# Patient Record
Sex: FEMALE | Race: WHITE | Employment: OTHER | URBAN - METROPOLITAN AREA
[De-identification: names, ages, dates, MRNs, and addresses within clinical notes are randomized per-mention and may not be internally consistent; named-entity substitution may affect disease eponyms.]

---

## 2017-02-22 PROCEDURE — 85025 COMPLETE CBC W/AUTO DIFF WBC: CPT | Performed by: INTERNAL MEDICINE

## 2017-02-22 PROCEDURE — 80048 BASIC METABOLIC PNL TOTAL CA: CPT | Performed by: INTERNAL MEDICINE

## 2017-02-22 PROCEDURE — 36415 COLL VENOUS BLD VENIPUNCTURE: CPT | Performed by: INTERNAL MEDICINE

## 2017-02-28 RX ORDER — TROSPIUM CHLORIDE ER 60 MG/1
60 CAPSULE ORAL DAILY
COMMUNITY
End: 2017-10-13

## 2017-02-28 RX ORDER — SODIUM CHLORIDE 9 MG/ML
INJECTION, SOLUTION INTRAVENOUS ONCE
Status: COMPLETED | OUTPATIENT
Start: 2017-03-01 | End: 2017-03-01

## 2017-03-01 ENCOUNTER — HOSPITAL ENCOUNTER (OUTPATIENT)
Dept: INTERVENTIONAL RADIOLOGY/VASCULAR | Facility: HOSPITAL | Age: 82
Discharge: HOME | End: 2017-03-01
Attending: INTERNAL MEDICINE | Admitting: INTERNAL MEDICINE
Payer: MEDICARE

## 2017-03-01 VITALS
HEART RATE: 78 BPM | HEIGHT: 65.25 IN | RESPIRATION RATE: 18 BRPM | OXYGEN SATURATION: 99 % | DIASTOLIC BLOOD PRESSURE: 62 MMHG | SYSTOLIC BLOOD PRESSURE: 117 MMHG | WEIGHT: 130 LBS | BODY MASS INDEX: 21.4 KG/M2

## 2017-03-01 DIAGNOSIS — Z45.018 PACEMAKER END OF LIFE: ICD-10-CM

## 2017-03-01 DIAGNOSIS — I44.1 SECOND DEGREE HEART BLOCK: ICD-10-CM

## 2017-03-01 LAB — MRSA DNA SPEC QL NAA+PROBE: NEGATIVE

## 2017-03-01 PROCEDURE — 33228 REMV&REPLC PM GEN DUAL LEAD: CPT

## 2017-03-01 PROCEDURE — 87641 MR-STAPH DNA AMP PROBE: CPT | Performed by: INTERNAL MEDICINE

## 2017-03-01 RX ORDER — SODIUM CHLORIDE 9 MG/ML
INJECTION, SOLUTION INTRAVENOUS
Status: COMPLETED
Start: 2017-03-01 | End: 2017-03-01

## 2017-03-01 RX ORDER — MIDAZOLAM HYDROCHLORIDE 1 MG/ML
INJECTION INTRAMUSCULAR; INTRAVENOUS
Status: COMPLETED
Start: 2017-03-01 | End: 2017-03-01

## 2017-03-01 RX ORDER — BACITRACIN 50000 [USP'U]/1
INJECTION, POWDER, LYOPHILIZED, FOR SOLUTION INTRAMUSCULAR
Status: COMPLETED
Start: 2017-03-01 | End: 2017-03-01

## 2017-03-01 RX ORDER — DIPHENHYDRAMINE HYDROCHLORIDE 50 MG/ML
INJECTION INTRAMUSCULAR; INTRAVENOUS
Status: DISCONTINUED
Start: 2017-03-01 | End: 2017-03-01 | Stop reason: WASHOUT

## 2017-03-01 RX ORDER — LIDOCAINE HYDROCHLORIDE AND EPINEPHRINE 10; 10 MG/ML; UG/ML
INJECTION, SOLUTION INFILTRATION; PERINEURAL
Status: COMPLETED
Start: 2017-03-01 | End: 2017-03-01

## 2017-03-01 RX ADMIN — SODIUM CHLORIDE: 9 INJECTION, SOLUTION INTRAVENOUS at 09:30:00

## 2017-03-01 NOTE — PROCEDURES
[facility]    PATIENT'S NAME: Roberts Carrel A   ATTENDING PHYSICIAN: Roly Briscoe MD   OPERATING PHYSICIAN: Yary Bailey MD   PATIENT ACCOUNT#:   [id number]    LOCATION:  [place] [address]  MEDICAL RECORD #:   [id number] R waves of 12.5, pacing impedance 937, pacing threshold 0.75 V at 0.4 ms. The pocket was closed with interrupted 2-0 Vicryl, reinforced with running 3-0 Vicryl. The skin was closed with subcuticular 4-0 Dexon and reinforced with Steri-Strips.   The grady

## 2017-03-01 NOTE — PLAN OF CARE
Pt ambulated in room and to tyfaucyf-rkucvr-ovwfojpyo activity well Liquids and food taken post procedure and tolerated well

## 2017-11-30 ENCOUNTER — HOSPITAL ENCOUNTER (OUTPATIENT)
Dept: MAMMOGRAPHY | Age: 82
Discharge: HOME | End: 2017-11-30
Attending: INTERNAL MEDICINE
Payer: MEDICARE

## 2017-11-30 DIAGNOSIS — Z12.31 VISIT FOR SCREENING MAMMOGRAM: ICD-10-CM

## 2017-11-30 DIAGNOSIS — Z12.31 ENCOUNTER FOR SCREENING MAMMOGRAM FOR MALIGNANT NEOPLASM OF BREAST: ICD-10-CM

## 2017-12-18 ENCOUNTER — HOSPITAL ENCOUNTER (OUTPATIENT)
Age: 82
Discharge: HOME | End: 2017-12-18
Attending: FAMILY MEDICINE
Payer: MEDICARE

## 2017-12-18 VITALS
SYSTOLIC BLOOD PRESSURE: 128 MMHG | DIASTOLIC BLOOD PRESSURE: 71 MMHG | HEIGHT: 65.5 IN | TEMPERATURE: 98 F | WEIGHT: 126 LBS | RESPIRATION RATE: 18 BRPM | HEART RATE: 94 BPM | OXYGEN SATURATION: 100 % | BODY MASS INDEX: 20.74 KG/M2

## 2017-12-18 DIAGNOSIS — B02.9 HERPES ZOSTER WITHOUT COMPLICATION: Primary | ICD-10-CM

## 2017-12-18 PROCEDURE — 99214 OFFICE O/P EST MOD 30 MIN: CPT

## 2017-12-18 PROCEDURE — 99213 OFFICE O/P EST LOW 20 MIN: CPT

## 2017-12-18 RX ORDER — VALACYCLOVIR HYDROCHLORIDE 1 G/1
1 TABLET, FILM COATED ORAL 3 TIMES DAILY
Qty: 21 TABLET | Refills: 0 | Status: SHIPPED | OUTPATIENT
Start: 2017-12-18 | End: 2017-12-25

## 2017-12-18 NOTE — ED PROVIDER NOTES
Patient Seen in: [facility] In [address] [place] [address]    History   Patient presents with:  Rash Skin Problem (integumentary)    Stated Complaint: rash    HPI    Rash over R lower chest and lateral chest  X 4 days  Mild burning and pain   No other erythematous base along T10 dermatome.      ED Course   Labs Reviewed - No data to display    ED Course as of Dec 18 1410  ------------------------------------------------------------       [facility]       Disposition and Plan     Clinical Impression:  Herpes zos

## 2018-03-20 ENCOUNTER — HOSPITAL ENCOUNTER (OUTPATIENT)
Dept: MAMMOGRAPHY | Age: 83
Discharge: HOME | End: 2018-03-20
Attending: INTERNAL MEDICINE
Payer: MEDICARE

## 2018-03-20 PROCEDURE — 77067 SCR MAMMO BI INCL CAD: CPT | Performed by: INTERNAL MEDICINE

## 2018-08-19 ENCOUNTER — HOSPITAL ENCOUNTER (EMERGENCY)
Facility: HOSPITAL | Age: 83
Discharge: HOME | End: 2018-08-19
Attending: EMERGENCY MEDICINE
Payer: MEDICARE

## 2018-08-19 VITALS
HEART RATE: 81 BPM | WEIGHT: 128 LBS | TEMPERATURE: 98 F | OXYGEN SATURATION: 100 % | SYSTOLIC BLOOD PRESSURE: 145 MMHG | HEIGHT: 65 IN | BODY MASS INDEX: 21.33 KG/M2 | RESPIRATION RATE: 18 BRPM | DIASTOLIC BLOOD PRESSURE: 70 MMHG

## 2018-08-19 DIAGNOSIS — N39.0 URINARY TRACT INFECTION WITHOUT HEMATURIA, SITE UNSPECIFIED: Primary | ICD-10-CM

## 2018-08-19 LAB
BILIRUB UR QL: NEGATIVE
COLOR UR: YELLOW
GLUCOSE UR-MCNC: NEGATIVE MG/DL
KETONES UR-MCNC: NEGATIVE MG/DL
NITRITE UR QL STRIP.AUTO: NEGATIVE
PH UR: 7 [PH] (ref 5–8)
PROT UR-MCNC: NEGATIVE MG/DL
RBC #/AREA URNS AUTO: 4 /HPF
SP GR UR STRIP: 1.01 (ref 1–1.03)
UROBILINOGEN UR STRIP-ACNC: <2
VIT C UR-MCNC: NEGATIVE MG/DL
WBC #/AREA URNS AUTO: 312 /HPF

## 2018-08-19 PROCEDURE — 87088 URINE BACTERIA CULTURE: CPT | Performed by: EMERGENCY MEDICINE

## 2018-08-19 PROCEDURE — 87186 SC STD MICRODIL/AGAR DIL: CPT | Performed by: EMERGENCY MEDICINE

## 2018-08-19 PROCEDURE — 87086 URINE CULTURE/COLONY COUNT: CPT | Performed by: EMERGENCY MEDICINE

## 2018-08-19 PROCEDURE — 99283 EMERGENCY DEPT VISIT LOW MDM: CPT

## 2018-08-19 PROCEDURE — 81001 URINALYSIS AUTO W/SCOPE: CPT | Performed by: EMERGENCY MEDICINE

## 2018-08-19 RX ORDER — SULFAMETHOXAZOLE AND TRIMETHOPRIM 800; 160 MG/1; MG/1
1 TABLET ORAL 2 TIMES DAILY
Qty: 14 TABLET | Refills: 0 | Status: SHIPPED | OUTPATIENT
Start: 2018-08-19 | End: 2018-08-26

## 2018-08-19 NOTE — ED PROVIDER NOTES
Patient Seen in: [facility] AND [facility] Emergency Department    History   Patient presents with:  Urinary Symptoms (urologic)    Stated Complaint:     HPI    59-year-old female with history of overactive bladder, prior urinary tract infections, Parkinson's di SpO2 100%   BMI 21.30 kg/m²         Physical Exam    General Appearance: alert, no distress  Eyes: pupils equal and round no pallor or injection  ENT, Mouth: mucous membranes moist  Respiratory: there are no retractions, lungs are clear to auscultation  C tablet  Take 1 tablet by mouth 2 (two) times daily.   Qty: 14 tablet Refills: 0

## 2018-09-10 ENCOUNTER — HOSPITAL ENCOUNTER (OUTPATIENT)
Dept: CT IMAGING | Facility: HOSPITAL | Age: 83
Discharge: HOME | End: 2018-09-10
Attending: ORTHOPAEDIC SURGERY
Payer: MEDICARE

## 2018-09-10 DIAGNOSIS — M17.11 OSTEOARTHRITIS OF RIGHT KNEE: ICD-10-CM

## 2018-09-10 PROCEDURE — 73700 CT LOWER EXTREMITY W/O DYE: CPT | Performed by: ORTHOPAEDIC SURGERY

## 2018-10-30 ENCOUNTER — LAB ENCOUNTER (OUTPATIENT)
Dept: LAB | Age: 83
End: 2018-10-30
Attending: ORTHOPAEDIC SURGERY
Payer: MEDICARE

## 2018-10-30 DIAGNOSIS — Z01.818 PREOP TESTING: ICD-10-CM

## 2018-10-30 PROCEDURE — 86850 RBC ANTIBODY SCREEN: CPT

## 2018-10-30 PROCEDURE — 36415 COLL VENOUS BLD VENIPUNCTURE: CPT

## 2018-10-30 PROCEDURE — 86900 BLOOD TYPING SEROLOGIC ABO: CPT

## 2018-10-30 PROCEDURE — 86901 BLOOD TYPING SEROLOGIC RH(D): CPT

## 2018-11-05 ENCOUNTER — APPOINTMENT (OUTPATIENT)
Dept: LAB | Age: 83
DRG: 470 | End: 2018-11-05
Attending: ORTHOPAEDIC SURGERY
Payer: MEDICARE

## 2018-11-05 DIAGNOSIS — Z01.818 PREOP TESTING: ICD-10-CM

## 2018-11-05 PROCEDURE — 87641 MR-STAPH DNA AMP PROBE: CPT

## 2018-11-05 NOTE — H&P
[facility]    History & Physical    Gabriela De La Rosa Patient Status:  Surgery Admit    1935 MRN [id number]   Location [place] [address] Attending Roosevelt Das, *   Hosp Day # 0 PCP Jonas Johnson MD, MD History   Problem Relation Age of Onset   • Breast Cancer Paternal Aunt 61   • Heart Disease Father         CAD   • Hypertension Father    • Cancer Mother 50        stomach cancer    • Cancer Other         NO family h/o bladder or kidney cancer   • Kidney skyline views of the right knee demonstrates fairly advanced osteoarthritis involving the lateral compartment with subchondral sclerosis. There are possibly some loose bodies posteriorly. There is significant chondrocalcinosis in both knees.         Assessm

## 2018-11-06 ENCOUNTER — APPOINTMENT (OUTPATIENT)
Dept: GENERAL RADIOLOGY | Facility: HOSPITAL | Age: 83
DRG: 470 | End: 2018-11-06
Attending: PHYSICIAN ASSISTANT
Payer: MEDICARE

## 2018-11-06 ENCOUNTER — ANESTHESIA EVENT (OUTPATIENT)
Dept: SURGERY | Facility: HOSPITAL | Age: 83
DRG: 470 | End: 2018-11-06
Payer: MEDICARE

## 2018-11-06 ENCOUNTER — HOSPITAL ENCOUNTER (INPATIENT)
Facility: HOSPITAL | Age: 83
LOS: 3 days | Discharge: NURSING, REHAB OR LONG-TERM CARE | DRG: 470 | End: 2018-11-09
Attending: ORTHOPAEDIC SURGERY | Admitting: ORTHOPAEDIC SURGERY
Payer: MEDICARE

## 2018-11-06 ENCOUNTER — ANESTHESIA (OUTPATIENT)
Dept: SURGERY | Facility: HOSPITAL | Age: 83
DRG: 470 | End: 2018-11-06
Payer: MEDICARE

## 2018-11-06 DIAGNOSIS — Z01.818 PREOP TESTING: Primary | ICD-10-CM

## 2018-11-06 PROBLEM — G20 PARKINSON DISEASE (HCC): Chronic | Status: ACTIVE | Noted: 2018-11-06

## 2018-11-06 PROBLEM — M17.11 OSTEOARTHRITIS OF RIGHT KNEE: Status: ACTIVE | Noted: 2018-11-06

## 2018-11-06 PROBLEM — G20.A1 PARKINSON DISEASE: Chronic | Status: ACTIVE | Noted: 2018-11-06

## 2018-11-06 PROBLEM — G20.A1 PARKINSON DISEASE (HCC): Chronic | Status: ACTIVE | Noted: 2018-11-06

## 2018-11-06 PROCEDURE — 0SRC0J9 REPLACEMENT OF RIGHT KNEE JOINT WITH SYNTHETIC SUBSTITUTE, CEMENTED, OPEN APPROACH: ICD-10-PCS | Performed by: ORTHOPAEDIC SURGERY

## 2018-11-06 PROCEDURE — 73560 X-RAY EXAM OF KNEE 1 OR 2: CPT | Performed by: PHYSICIAN ASSISTANT

## 2018-11-06 PROCEDURE — 99232 SBSQ HOSP IP/OBS MODERATE 35: CPT | Performed by: HOSPITALIST

## 2018-11-06 DEVICE — FEMUR SPHERE CEMENTED RIGHT, SIZE 3+
Type: IMPLANTABLE DEVICE | Site: KNEE | Status: FUNCTIONAL
Brand: GMK SPHERE TOTAL KNEE SYSTEM

## 2018-11-06 DEVICE — BONE CEMENT HI VISCOSITY R+G: Type: IMPLANTABLE DEVICE | Site: KNEE | Status: FUNCTIONAL

## 2018-11-06 DEVICE — FIXED TIBIAL TRAY CEMENTED RIGHT, SIZE 3
Type: IMPLANTABLE DEVICE | Site: KNEE | Status: FUNCTIONAL
Brand: GMK PRIMARY TOTAL KNEE SYSTEM

## 2018-11-06 RX ORDER — LIDOCAINE HYDROCHLORIDE 10 MG/ML
INJECTION, SOLUTION INFILTRATION; PERINEURAL
Status: COMPLETED | OUTPATIENT
Start: 2018-11-06 | End: 2018-11-06

## 2018-11-06 RX ORDER — MORPHINE SULFATE 2 MG/ML
2 INJECTION, SOLUTION INTRAMUSCULAR; INTRAVENOUS EVERY 2 HOUR PRN
Status: DISCONTINUED | OUTPATIENT
Start: 2018-11-06 | End: 2018-11-09

## 2018-11-06 RX ORDER — FAMOTIDINE 20 MG/1
20 TABLET ORAL ONCE
Status: DISCONTINUED | OUTPATIENT
Start: 2018-11-06 | End: 2018-11-06 | Stop reason: HOSPADM

## 2018-11-06 RX ORDER — SENNOSIDES 8.6 MG
17.2 TABLET ORAL NIGHTLY
Status: DISCONTINUED | OUTPATIENT
Start: 2018-11-06 | End: 2018-11-09

## 2018-11-06 RX ORDER — ASPIRIN 325 MG
325 TABLET ORAL 2 TIMES DAILY
Status: DISCONTINUED | OUTPATIENT
Start: 2018-11-06 | End: 2018-11-09

## 2018-11-06 RX ORDER — SODIUM CHLORIDE, SODIUM LACTATE, POTASSIUM CHLORIDE, CALCIUM CHLORIDE 600; 310; 30; 20 MG/100ML; MG/100ML; MG/100ML; MG/100ML
INJECTION, SOLUTION INTRAVENOUS CONTINUOUS
Status: DISCONTINUED | OUTPATIENT
Start: 2018-11-06 | End: 2018-11-06 | Stop reason: HOSPADM

## 2018-11-06 RX ORDER — SENNOSIDES 8.6 MG
8.6 TABLET ORAL DAILY
Status: DISCONTINUED | OUTPATIENT
Start: 2018-11-06 | End: 2018-11-09

## 2018-11-06 RX ORDER — MORPHINE SULFATE 4 MG/ML
2 INJECTION, SOLUTION INTRAMUSCULAR; INTRAVENOUS EVERY 10 MIN PRN
Status: DISCONTINUED | OUTPATIENT
Start: 2018-11-06 | End: 2018-11-06 | Stop reason: HOSPADM

## 2018-11-06 RX ORDER — ACETAMINOPHEN 325 MG/1
650 TABLET ORAL EVERY 6 HOURS PRN
Status: ACTIVE | OUTPATIENT
Start: 2018-11-06 | End: 2018-11-07

## 2018-11-06 RX ORDER — ACETAMINOPHEN 325 MG/1
325 TABLET ORAL EVERY 6 HOURS PRN
Status: DISCONTINUED | OUTPATIENT
Start: 2018-11-06 | End: 2018-11-09

## 2018-11-06 RX ORDER — LIDOCAINE HYDROCHLORIDE 10 MG/ML
INJECTION, SOLUTION EPIDURAL; INFILTRATION; INTRACAUDAL; PERINEURAL AS NEEDED
Status: DISCONTINUED | OUTPATIENT
Start: 2018-11-06 | End: 2018-11-06 | Stop reason: SURG

## 2018-11-06 RX ORDER — ACETAMINOPHEN 500 MG
1000 TABLET ORAL ONCE
Status: DISCONTINUED | OUTPATIENT
Start: 2018-11-06 | End: 2018-11-06 | Stop reason: HOSPADM

## 2018-11-06 RX ORDER — BUPIVACAINE HYDROCHLORIDE AND EPINEPHRINE 5; 5 MG/ML; UG/ML
INJECTION, SOLUTION PERINEURAL AS NEEDED
Status: DISCONTINUED | OUTPATIENT
Start: 2018-11-06 | End: 2018-11-06 | Stop reason: HOSPADM

## 2018-11-06 RX ORDER — DIPHENHYDRAMINE HCL 25 MG
25 CAPSULE ORAL EVERY 4 HOURS PRN
Status: ACTIVE | OUTPATIENT
Start: 2018-11-06 | End: 2018-11-07

## 2018-11-06 RX ORDER — HYDROCODONE BITARTRATE AND ACETAMINOPHEN 5; 325 MG/1; MG/1
2 TABLET ORAL EVERY 4 HOURS PRN
Status: DISCONTINUED | OUTPATIENT
Start: 2018-11-06 | End: 2018-11-09

## 2018-11-06 RX ORDER — MORPHINE SULFATE 2 MG/ML
1 INJECTION, SOLUTION INTRAMUSCULAR; INTRAVENOUS EVERY 2 HOUR PRN
Status: DISCONTINUED | OUTPATIENT
Start: 2018-11-06 | End: 2018-11-09

## 2018-11-06 RX ORDER — POLYETHYLENE GLYCOL 3350 17 G/17G
17 POWDER, FOR SOLUTION ORAL DAILY PRN
Status: DISCONTINUED | OUTPATIENT
Start: 2018-11-06 | End: 2018-11-09

## 2018-11-06 RX ORDER — MORPHINE SULFATE 1 MG/ML
INJECTION, SOLUTION EPIDURAL; INTRATHECAL; INTRAVENOUS
Status: COMPLETED | OUTPATIENT
Start: 2018-11-06 | End: 2018-11-06

## 2018-11-06 RX ORDER — DIPHENHYDRAMINE HYDROCHLORIDE 50 MG/ML
25 INJECTION INTRAMUSCULAR; INTRAVENOUS ONCE AS NEEDED
Status: ACTIVE | OUTPATIENT
Start: 2018-11-06 | End: 2018-11-06

## 2018-11-06 RX ORDER — HYDROCODONE BITARTRATE AND ACETAMINOPHEN 5; 325 MG/1; MG/1
1 TABLET ORAL EVERY 4 HOURS PRN
Status: DISCONTINUED | OUTPATIENT
Start: 2018-11-06 | End: 2018-11-09

## 2018-11-06 RX ORDER — BUPIVACAINE HYDROCHLORIDE 7.5 MG/ML
INJECTION, SOLUTION INTRASPINAL
Status: COMPLETED | OUTPATIENT
Start: 2018-11-06 | End: 2018-11-06

## 2018-11-06 RX ORDER — DEXTROSE, SODIUM CHLORIDE, AND POTASSIUM CHLORIDE 5; .45; .15 G/100ML; G/100ML; G/100ML
INJECTION INTRAVENOUS CONTINUOUS
Status: DISCONTINUED | OUTPATIENT
Start: 2018-11-06 | End: 2018-11-08

## 2018-11-06 RX ORDER — DOCUSATE SODIUM 100 MG/1
100 CAPSULE, LIQUID FILLED ORAL 2 TIMES DAILY
Status: DISCONTINUED | OUTPATIENT
Start: 2018-11-06 | End: 2018-11-09

## 2018-11-06 RX ORDER — HYDROCODONE BITARTRATE AND ACETAMINOPHEN 7.5; 325 MG/1; MG/1
2 TABLET ORAL EVERY 6 HOURS PRN
Status: ACTIVE | OUTPATIENT
Start: 2018-11-06 | End: 2018-11-07

## 2018-11-06 RX ORDER — SODIUM CHLORIDE 0.9 % (FLUSH) 0.9 %
10 SYRINGE (ML) INJECTION AS NEEDED
Status: DISCONTINUED | OUTPATIENT
Start: 2018-11-06 | End: 2018-11-09

## 2018-11-06 RX ORDER — HYDROCODONE BITARTRATE AND ACETAMINOPHEN 5; 325 MG/1; MG/1
2 TABLET ORAL AS NEEDED
Status: DISCONTINUED | OUTPATIENT
Start: 2018-11-06 | End: 2018-11-06 | Stop reason: HOSPADM

## 2018-11-06 RX ORDER — PANTOPRAZOLE SODIUM 40 MG/1
40 TABLET, DELAYED RELEASE ORAL
Status: DISCONTINUED | OUTPATIENT
Start: 2018-11-07 | End: 2018-11-09

## 2018-11-06 RX ORDER — CELECOXIB 200 MG/1
200 CAPSULE ORAL EVERY 12 HOURS SCHEDULED
Status: DISCONTINUED | OUTPATIENT
Start: 2018-11-06 | End: 2018-11-09

## 2018-11-06 RX ORDER — CARBIDOPA AND LEVODOPA 25; 100 MG/1; MG/1
0.5 TABLET, ORALLY DISINTEGRATING ORAL AS NEEDED
COMMUNITY

## 2018-11-06 RX ORDER — MELATONIN
325
Status: DISCONTINUED | OUTPATIENT
Start: 2018-11-07 | End: 2018-11-09

## 2018-11-06 RX ORDER — HALOPERIDOL 5 MG/ML
0.25 INJECTION INTRAMUSCULAR ONCE AS NEEDED
Status: DISCONTINUED | OUTPATIENT
Start: 2018-11-06 | End: 2018-11-06 | Stop reason: HOSPADM

## 2018-11-06 RX ORDER — NALBUPHINE HCL 10 MG/ML
2.5 AMPUL (ML) INJECTION EVERY 4 HOURS PRN
Status: ACTIVE | OUTPATIENT
Start: 2018-11-06 | End: 2018-11-07

## 2018-11-06 RX ORDER — ACETAMINOPHEN 500 MG
1000 TABLET ORAL ONCE
Status: COMPLETED | OUTPATIENT
Start: 2018-11-06 | End: 2018-11-06

## 2018-11-06 RX ORDER — FAMOTIDINE 10 MG/ML
20 INJECTION, SOLUTION INTRAVENOUS 2 TIMES DAILY
Status: DISCONTINUED | OUTPATIENT
Start: 2018-11-06 | End: 2018-11-07

## 2018-11-06 RX ORDER — BISACODYL 10 MG
10 SUPPOSITORY, RECTAL RECTAL
Status: DISCONTINUED | OUTPATIENT
Start: 2018-11-06 | End: 2018-11-09

## 2018-11-06 RX ORDER — SODIUM PHOSPHATE, DIBASIC AND SODIUM PHOSPHATE, MONOBASIC 7; 19 G/133ML; G/133ML
1 ENEMA RECTAL ONCE AS NEEDED
Status: DISCONTINUED | OUTPATIENT
Start: 2018-11-06 | End: 2018-11-09

## 2018-11-06 RX ORDER — HYDROCODONE BITARTRATE AND ACETAMINOPHEN 5; 325 MG/1; MG/1
1 TABLET ORAL AS NEEDED
Status: DISCONTINUED | OUTPATIENT
Start: 2018-11-06 | End: 2018-11-06 | Stop reason: HOSPADM

## 2018-11-06 RX ORDER — ONDANSETRON 2 MG/ML
4 INJECTION INTRAMUSCULAR; INTRAVENOUS ONCE AS NEEDED
Status: ACTIVE | OUTPATIENT
Start: 2018-11-06 | End: 2018-11-06

## 2018-11-06 RX ORDER — DIPHENHYDRAMINE HYDROCHLORIDE 50 MG/ML
12.5 INJECTION INTRAMUSCULAR; INTRAVENOUS EVERY 4 HOURS PRN
Status: ACTIVE | OUTPATIENT
Start: 2018-11-06 | End: 2018-11-07

## 2018-11-06 RX ORDER — MORPHINE SULFATE 4 MG/ML
4 INJECTION, SOLUTION INTRAMUSCULAR; INTRAVENOUS EVERY 2 HOUR PRN
Status: DISCONTINUED | OUTPATIENT
Start: 2018-11-06 | End: 2018-11-09

## 2018-11-06 RX ORDER — NALOXONE HYDROCHLORIDE 0.4 MG/ML
80 INJECTION, SOLUTION INTRAMUSCULAR; INTRAVENOUS; SUBCUTANEOUS AS NEEDED
Status: DISCONTINUED | OUTPATIENT
Start: 2018-11-06 | End: 2018-11-06 | Stop reason: HOSPADM

## 2018-11-06 RX ORDER — MORPHINE SULFATE 4 MG/ML
4 INJECTION, SOLUTION INTRAMUSCULAR; INTRAVENOUS EVERY 10 MIN PRN
Status: DISCONTINUED | OUTPATIENT
Start: 2018-11-06 | End: 2018-11-06 | Stop reason: HOSPADM

## 2018-11-06 RX ORDER — METOCLOPRAMIDE HYDROCHLORIDE 5 MG/ML
10 INJECTION INTRAMUSCULAR; INTRAVENOUS EVERY 6 HOURS PRN
Status: ACTIVE | OUTPATIENT
Start: 2018-11-06 | End: 2018-11-08

## 2018-11-06 RX ORDER — GABAPENTIN 600 MG/1
600 TABLET ORAL ONCE
Status: COMPLETED | OUTPATIENT
Start: 2018-11-06 | End: 2018-11-06

## 2018-11-06 RX ORDER — ONDANSETRON 2 MG/ML
4 INJECTION INTRAMUSCULAR; INTRAVENOUS ONCE AS NEEDED
Status: COMPLETED | OUTPATIENT
Start: 2018-11-06 | End: 2018-11-06

## 2018-11-06 RX ORDER — MIDAZOLAM HYDROCHLORIDE 1 MG/ML
INJECTION INTRAMUSCULAR; INTRAVENOUS AS NEEDED
Status: DISCONTINUED | OUTPATIENT
Start: 2018-11-06 | End: 2018-11-06 | Stop reason: SURG

## 2018-11-06 RX ORDER — HYDROCODONE BITARTRATE AND ACETAMINOPHEN 7.5; 325 MG/1; MG/1
1 TABLET ORAL EVERY 6 HOURS PRN
Status: ACTIVE | OUTPATIENT
Start: 2018-11-06 | End: 2018-11-07

## 2018-11-06 RX ORDER — MORPHINE SULFATE 10 MG/ML
6 INJECTION, SOLUTION INTRAMUSCULAR; INTRAVENOUS EVERY 10 MIN PRN
Status: DISCONTINUED | OUTPATIENT
Start: 2018-11-06 | End: 2018-11-06 | Stop reason: HOSPADM

## 2018-11-06 RX ORDER — NALOXONE HYDROCHLORIDE 0.4 MG/ML
0.08 INJECTION, SOLUTION INTRAMUSCULAR; INTRAVENOUS; SUBCUTANEOUS
Status: ACTIVE | OUTPATIENT
Start: 2018-11-06 | End: 2018-11-07

## 2018-11-06 RX ORDER — EPHEDRINE SULFATE 50 MG/ML
INJECTION, SOLUTION INTRAVENOUS AS NEEDED
Status: DISCONTINUED | OUTPATIENT
Start: 2018-11-06 | End: 2018-11-06 | Stop reason: SURG

## 2018-11-06 RX ORDER — METOCLOPRAMIDE 10 MG/1
10 TABLET ORAL ONCE
Status: DISCONTINUED | OUTPATIENT
Start: 2018-11-06 | End: 2018-11-06 | Stop reason: HOSPADM

## 2018-11-06 RX ORDER — ACETAMINOPHEN 325 MG/1
650 TABLET ORAL EVERY 4 HOURS PRN
Status: DISCONTINUED | OUTPATIENT
Start: 2018-11-06 | End: 2018-11-09

## 2018-11-06 RX ORDER — DIPHENHYDRAMINE HCL 25 MG
25 CAPSULE ORAL EVERY 4 HOURS PRN
Status: DISCONTINUED | OUTPATIENT
Start: 2018-11-06 | End: 2018-11-09

## 2018-11-06 RX ORDER — ROSUVASTATIN CALCIUM 10 MG/1
10 TABLET, COATED ORAL DAILY
Status: DISCONTINUED | OUTPATIENT
Start: 2018-11-06 | End: 2018-11-09

## 2018-11-06 RX ORDER — FAMOTIDINE 20 MG/1
20 TABLET ORAL 2 TIMES DAILY
Status: DISCONTINUED | OUTPATIENT
Start: 2018-11-06 | End: 2018-11-07

## 2018-11-06 RX ORDER — ONDANSETRON 2 MG/ML
4 INJECTION INTRAMUSCULAR; INTRAVENOUS EVERY 4 HOURS PRN
Status: DISPENSED | OUTPATIENT
Start: 2018-11-06 | End: 2018-11-08

## 2018-11-06 RX ORDER — CEFAZOLIN SODIUM/WATER 2 G/20 ML
2 SYRINGE (ML) INTRAVENOUS ONCE
Status: COMPLETED | OUTPATIENT
Start: 2018-11-06 | End: 2018-11-06

## 2018-11-06 RX ORDER — SODIUM CHLORIDE, SODIUM LACTATE, POTASSIUM CHLORIDE, CALCIUM CHLORIDE 600; 310; 30; 20 MG/100ML; MG/100ML; MG/100ML; MG/100ML
INJECTION, SOLUTION INTRAVENOUS CONTINUOUS
Status: DISCONTINUED | OUTPATIENT
Start: 2018-11-06 | End: 2018-11-08

## 2018-11-06 RX ORDER — CELECOXIB 200 MG/1
400 CAPSULE ORAL ONCE
Status: COMPLETED | OUTPATIENT
Start: 2018-11-06 | End: 2018-11-06

## 2018-11-06 RX ORDER — DIPHENHYDRAMINE HYDROCHLORIDE 50 MG/ML
12.5 INJECTION INTRAMUSCULAR; INTRAVENOUS EVERY 4 HOURS PRN
Status: DISCONTINUED | OUTPATIENT
Start: 2018-11-06 | End: 2018-11-09

## 2018-11-06 RX ORDER — GABAPENTIN 300 MG/1
300 CAPSULE ORAL NIGHTLY
Status: DISCONTINUED | OUTPATIENT
Start: 2018-11-06 | End: 2018-11-09

## 2018-11-06 RX ADMIN — MIDAZOLAM HYDROCHLORIDE 0.5 MG: 1 INJECTION INTRAMUSCULAR; INTRAVENOUS at 09:52:00

## 2018-11-06 RX ADMIN — MIDAZOLAM HYDROCHLORIDE 0.5 MG: 1 INJECTION INTRAMUSCULAR; INTRAVENOUS at 09:47:00

## 2018-11-06 RX ADMIN — LIDOCAINE HYDROCHLORIDE 2 ML: 10 INJECTION, SOLUTION INFILTRATION; PERINEURAL at 10:12:00

## 2018-11-06 RX ADMIN — SODIUM CHLORIDE, SODIUM LACTATE, POTASSIUM CHLORIDE, CALCIUM CHLORIDE: 600; 310; 30; 20 INJECTION, SOLUTION INTRAVENOUS at 11:40:00

## 2018-11-06 RX ADMIN — BUPIVACAINE HYDROCHLORIDE 1.2 ML: 7.5 INJECTION, SOLUTION INTRASPINAL at 10:12:00

## 2018-11-06 RX ADMIN — SODIUM CHLORIDE, SODIUM LACTATE, POTASSIUM CHLORIDE, CALCIUM CHLORIDE: 600; 310; 30; 20 INJECTION, SOLUTION INTRAVENOUS at 09:44:00

## 2018-11-06 RX ADMIN — EPHEDRINE SULFATE 10 MG: 50 INJECTION, SOLUTION INTRAVENOUS at 10:04:00

## 2018-11-06 RX ADMIN — MORPHINE SULFATE 0.2 MG: 1 INJECTION, SOLUTION EPIDURAL; INTRATHECAL; INTRAVENOUS at 10:12:00

## 2018-11-06 RX ADMIN — CEFAZOLIN SODIUM/WATER 2 G: 2 G/20 ML SYRINGE (ML) INTRAVENOUS at 09:55:00

## 2018-11-06 RX ADMIN — EPHEDRINE SULFATE 10 MG: 50 INJECTION, SOLUTION INTRAVENOUS at 11:21:00

## 2018-11-06 RX ADMIN — LIDOCAINE HYDROCHLORIDE 40 MG: 10 INJECTION, SOLUTION EPIDURAL; INFILTRATION; INTRACAUDAL; PERINEURAL at 09:52:00

## 2018-11-06 NOTE — PHYSICAL THERAPY NOTE
PHYSICAL THERAPY KNEE EVALUATION - INPATIENT       Room Number: 442/442-A  Evaluation Date: 11/6/2018  Type of Evaluation: Initial  Physician Order: PT Eval and Treat    Presenting Problem: R TKA  Reason for Therapy: Mobility Dysfunction and Discharge Plan preparation for discharge. DISCHARGE RECOMMENDATIONS  PT Discharge Recommendations: Sub-acute rehabilitation    PLAN  PT Treatment Plan: Bed mobility; Patient education;Gait training;Family education;Range of motion;Strengthening;Stoop training;Stair tra Bearing as Tolerated       PAIN ASSESSMENT  Ratin          COGNITION  · Overall Cognitive Status:  WFL - within functional limits  · Arousal/Alertness:  lethargic    RANGE OF MOTION AND STRENGTH ASSESSMENT  Upper extremity ROM and strength are within f Not tested       Bed Mobility: Min A x 1    Transfers: Min A x 1 with use of RW upon standing.      Exercise/Education Provided:  Bed mobility  Energy conservation  Functional activity tolerated  Gait training  Posture  ROM  Strengthening  Lower therapeutic

## 2018-11-06 NOTE — ANESTHESIA PROCEDURE NOTES
Spinal Block  Performed by: Pasquale Sanchez CRNA  Authorized by: Stephan Antoine MD     Patient Location:  OR  Start Time:  11/6/2018 9:49 AM  End Time:  11/6/2018 9:51 AM  Anesthesiologist:  Stephan Antoine MD  CRNA:  Pasquale Sanchez CRNA  Perform

## 2018-11-06 NOTE — BRIEF OP NOTE
Pre-Operative Diagnosis: Osteoarthritis right knee     Post-Operative Diagnosis: Osteoarthritis right knee      Procedure Performed:   Procedure(s):  Right total knee arthroplasty    Surgeon(s) and Role:     * Justice Whyte MD - Primary    Assist

## 2018-11-06 NOTE — PROGRESS NOTES
5 [facility] Patient Status:  Inpatient    1935 MRN [id number]   Location [facility] 4W/SW/SE Attending Adolph Ding MD   Hosp Day # 0 PCP Melody Bermudez MD, MD     Subjective:  Post-Operative Day: 0 Status Pos

## 2018-11-06 NOTE — PROGRESS NOTES
[facility] - [facility]    Progress Note    Fang Muhammad Patient Status:  Surgery Admit    1935 MRN [id number]   Location [address] UNIT Attending Melva Guido MD   Hosp Day # 0 PCP [facility] MD, Devika Ervin Parkinson disease ([facility] [address]  CONT NENO EMEDS. HISTORY OF THIRD DIGREE HB, PACEMAKER.          Results:     Lab Results   Component Value Date    WBC 5.7 02/22/2017    HGB 13.2 02/22/2017    HCT 39.9 02/22/2017    .0 02/22/2017    CREATSERUM 0.84

## 2018-11-06 NOTE — OPERATIVE REPORT
[facility]    PATIENT'S NAME: Zoya Mast   ATTENDING PHYSICIAN: Elsie Ibrahim MD   OPERATING PHYSICIAN: Elsie Ibrahim MD   PATIENT ACCOUNT#:   [id number]    LOCATION:  [address]  MEDICAL RECORD #:   [id number] a patella protector was placed over the cut surface of the patella which was then subluxed into the lateral gutter. The anterior and posterior cruciate ligaments were excised, and the femoral template was placed over the distal femur and seated well.   It for the stemmed portion of the tibial component. The trochlear recess was cut for the femur. The trial components were then removed, and the bone was prepared with pulsatile lavage.   All three components were cemented in place using contemporary techniqu

## 2018-11-06 NOTE — ANESTHESIA PREPROCEDURE EVALUATION
Anesthesia PreOp Note    HPI:     Maya Cruz is a 81year old female who presents for preoperative consultation requested by: Nicko Singh MD    Date of Surgery: 11/6/2018    Procedure(s):  KNEE TOTAL REPLACEMENT  Indication: Ronnie Unger by mouth daily. Disp: 90 tablet Rfl: 3 11/5/2018 at 2100   Coenzyme Q10 (COQ10 OR) Take 20 mg by mouth.  Disp:  Rfl:  11/1/2018 at Unknown time   Pantoprazole Sodium 40 MG Oral Tab EC Take 40 mg by mouth every morning before breakfast. Disp:  Rfl:  11/6/201 (ANCEF/KEFZOL) 2 GM/20ML premix IV syringe 2 g 2 g Intravenous Once Finn Hidalgo MD      No current [facility]-ordered outpatient medications on file.       Biaxin [Clarithromy*    RASH  Iodides (Topical)       SWELLING  Iodine (Topical)        SWELLIN Exercise: Not Asked        Bike Helmet: Not Asked        Seat Belt: Not Asked        Self-Exams: Not Asked    Social History Narrative      Not on file      Available pre-op labs reviewed. Lab Results   Component Value Date     09/10/2018    K 4. planned.   Monico Daley  11/6/2018 8:42 AM

## 2018-11-06 NOTE — ANESTHESIA POSTPROCEDURE EVALUATION
Patient: Fang Muhammad    Procedure Summary     Date:  11/06/18 Room / Location:  [address] [place] [address] MAIN OR 11 / [address] [place] [address] MAIN OR    Anesthesia Start:  6990 Anesthesia Stop:      Procedure:  KNEE TOTAL REPLACEMENT (Right ) Diagnosis:  (Osteoarthritis right knee)    Surge

## 2018-11-06 NOTE — CM/SW NOTE
MICHAELA met with the pt. And her dtr. At bedside. The pt. [place] alone [place] with 6 stairs up to the main level and 7 down to the lower level. The pt. Reports being independent prior to admission with adls, ambulation and driving. The pt.  Has 4 ch

## 2018-11-07 PROCEDURE — 99233 SBSQ HOSP IP/OBS HIGH 50: CPT | Performed by: HOSPITALIST

## 2018-11-07 NOTE — PROGRESS NOTES
[facility]    Progress Note    Gertrude Reyes Patient Status:  Inpatient    1935 MRN [id number]   Location [facility] 4W/SW/SE Attending Ruddy Hair MD   Hosp Day # 1 PCP Romie Leigh MD, MD       Subjective:   B

## 2018-11-07 NOTE — OCCUPATIONAL THERAPY NOTE
OCCUPATIONAL THERAPY EVALUATION - INPATIENT      Room Number: 442/442-A  Evaluation Date: 11/7/2018  Type of Evaluation: Initial  Presenting Problem: (RT TKA)    Physician Order: IP Consult to Occupational Therapy  Reason for Therapy: ADL/IADL Dysfunction deliveries   • Hyperlipidemia    • Incontinence    • Mitral valve prolapse     per NextGen:    • Neuropathy     fingers, feet   • Osteoarthritis    • Pacemaker    • Parkinson disease ([facility] [address]    • Ureteropelvic junction obstruction 1962    per NextGen:     • None    AM-PAC Score:  Score: 21  Approx Degree of Impairment: 32.79%  Standardized Score (AM-PAC Scale): 44.27  CMS Modifier (G-Code): CJ    FUNCTIONAL TRANSFER ASSESSMENT  Supine to Sit : Minimum assistance  Sit to Stand: Minimum assistance  Toilet Trans

## 2018-11-07 NOTE — PROGRESS NOTES
Pharmacy note: Duplicate Proton Pump Inhibitor with Histamine 2 blocker. Rodger Kaufman is a 81year old female who has been prescribed both Famotidine and Protonix.   Pepcid was discontinued per P&T approved protocol for duplicate therapy in adult pa

## 2018-11-07 NOTE — PLAN OF CARE
Problem: Patient Centered Care  Goal: Patient preferences are identified and integrated in the patient's plan of care  Interventions:  - What would you like us to know as we care for you?  \"This is the first time I have had a big surgery\"  - Provide timel anticipated neutropenic period  INTERVENTIONS  - Monitor WBC  - Administer growth factors as ordered  - Implement neutropenic guidelines  Outcome: Progressing      Problem: SAFETY ADULT - FALL  Goal: Free from fall injury  INTERVENTIONS:  - Assess pt frequ

## 2018-11-07 NOTE — PHYSICAL THERAPY NOTE
PHYSICAL THERAPY KNEE TREATMENT NOTE - INPATIENT     Room Number: 442/442-A             Presenting Problem: R TKA    Problem List  Principal Problem:    Osteoarthritis of right knee  Active Problems:    Dyslipidemia    Parkinson disease ([facility] [address]    Preop test Pain does limit tolerance for mobility . Pt pain at right knee sx site. Pt with hx of PD on admission. Pt with slower motor processing observed and does take longer with mobility tasks.   Pt with some spontaneous repetitive small movements observed on r RECOMMENDATIONS  PT Discharge Recommendations: 24 hour care/supervision;Sub-acute rehabilitation    PLAN  PT Treatment Plan: Bed mobility; Body mechanics; Endurance; Energy conservation;Patient education; Family education;Gait training;Range of motion;Strength a wheelchair)?: A Little   -   Need to walk in hospital room?: A Little   -   Climbing 3-5 steps with a railing?: A Lot     AM-PAC Score:  Raw Score: 17   PT Approx Degree of Impairment Score: 50.57%   Standardized Score (AM-PAC Scale): 42.13   CMS Modifie will negotiate 6 stairs/one curb w/ assistive device and CG   Goal #4   Current Status  NA  Plans for d/c to rehab    Goal #5  AROM 0 degrees extension to 95 degrees flexion     Goal #5   Current Status  as above    Goal #6 Patient independently performs h

## 2018-11-07 NOTE — PROGRESS NOTES
[facility] - [facility]    Progress Note    Gatito Jennings Patient Status:  Inpatient    1935 MRN [id number]   Location [facility] 4W/SW/SE Attending Ashley Baptiste, [address] [facility] Day # 1 PCP Nuno Perkins MD, MD       Subjective: Intravenous Q4H PRN   dextrose 5 % and 0.45 % NaCl with KCl 20 mEq infusion  Intravenous Continuous   aspirin tab 325 mg 325 mg Oral BID   celecoxib (CeleBREX) cap 200 mg 200 mg Oral Q12H   acetaminophen (TYLENOL) tab 650 mg 650 mg Oral Q4H PRN   Or      H WBC 5.7 02/22/2017     Lab Results   Component Value Date    HGB 10.4 (L) 11/07/2018    HGB 11.2 (L) 11/06/2018    HGB 13.2 02/22/2017    Lab Results   Component Value Date     (L) 11/07/2018     (L) 11/06/2018    .0 02/22/2017       R

## 2018-11-08 PROCEDURE — 99233 SBSQ HOSP IP/OBS HIGH 50: CPT | Performed by: HOSPITALIST

## 2018-11-08 RX ORDER — ASPIRIN 325 MG
325 TABLET ORAL 2 TIMES DAILY
Qty: 30 TABLET | Refills: 0 | Status: SHIPPED | OUTPATIENT
Start: 2018-11-08 | End: 2019-08-16

## 2018-11-08 RX ORDER — PSEUDOEPHEDRINE HCL 30 MG
100 TABLET ORAL 2 TIMES DAILY
Qty: 20 CAPSULE | Refills: 0 | Status: SHIPPED | OUTPATIENT
Start: 2018-11-08 | End: 2019-08-16

## 2018-11-08 RX ORDER — GABAPENTIN 300 MG/1
300 CAPSULE ORAL NIGHTLY
Qty: 10 CAPSULE | Refills: 0 | Status: SHIPPED | OUTPATIENT
Start: 2018-11-08 | End: 2019-08-16

## 2018-11-08 RX ORDER — CELECOXIB 200 MG/1
200 CAPSULE ORAL EVERY 12 HOURS SCHEDULED
Qty: 30 CAPSULE | Refills: 0 | Status: SHIPPED | OUTPATIENT
Start: 2018-11-08 | End: 2019-08-16

## 2018-11-08 RX ORDER — MELATONIN
325
Qty: 20 TABLET | Refills: 0 | Status: SHIPPED | OUTPATIENT
Start: 2018-11-09 | End: 2019-08-16

## 2018-11-08 RX ORDER — HYDROCODONE BITARTRATE AND ACETAMINOPHEN 5; 325 MG/1; MG/1
1 TABLET ORAL EVERY 4 HOURS PRN
Qty: 50 TABLET | Refills: 0 | Status: SHIPPED | OUTPATIENT
Start: 2018-11-08 | End: 2018-11-23

## 2018-11-08 NOTE — PROGRESS NOTES
[facility]    Progress Note    Morgan Henriquez Patient Status:  Inpatient    1935 MRN [id number]   Location [facility] 4W/SW/SE Attending Louis Cardona, [address] [facility] Day # 2 PCP Karoline Chavez MD, MD       Subjective: KRISTINA  11/8/2018

## 2018-11-08 NOTE — PHYSICAL THERAPY NOTE
PHYSICAL THERAPY KNEE TREATMENT NOTE - INPATIENT     Room Number: 442/442-A             Presenting Problem: R TKR    Problem List  Principal Problem:    Osteoarthritis of right knee  Active Problems:    Dyslipidemia    Parkinson disease ([facility] [address]    Preop test Fair -  Dynamic Standing: Poor +    ACTIVITY TOLERANCE    O2 WALK            AM-PAC '6-Clicks' INPATIENT SHORT FORM - BASIC MOBILITY  How much difficulty does the patient currently have. ..  -   Turning over in bed (including adjusting bedclothes, sheets an EOB @ level: Supervision      Goal #1   Current Status Mod A   Goal #2 Patient is able to demonstrate transfers Sit to/from Stand at assistance level: SBA      Goal #2  Current Status Mod A   Goal #3 Patient is able to ambulate 300 feet with assistive skyler

## 2018-11-08 NOTE — PROGRESS NOTES
[place] [facility] - [facility]    Progress Note    Jen Meth Patient Status:  Inpatient    1935 MRN [id number]   Location [facility] 4W/SW/SE Attending Juanjose Walters, [address] [facility] Day # 2 PCP Susana Mckinley MD, MD       Subjective: 200 mg 200 mg Oral Q12H   acetaminophen (TYLENOL) tab 650 mg 650 mg Oral Q4H PRN   Or      HYDROcodone-acetaminophen (NORCO) 5-325 MG per tab 1 tablet 1 tablet Oral Q4H PRN   Or      HYDROcodone-acetaminophen (NORCO) 5-325 MG per tab 2 tablet 2 tablet Oral Component Value Date     (L) 11/08/2018     (L) 11/07/2018     (L) 11/06/2018       Recent Labs   Lab  11/07/18   1117  11/08/18   0520   GLU  141*  136*   BUN  11  10   CREATSERUM  0.63  0.62   GFRAA  >60  >60   GFRNAA  >60  >60   C

## 2018-11-08 NOTE — PHYSICAL THERAPY NOTE
PHYSICAL THERAPY KNEE TREATMENT NOTE - INPATIENT     Room Number: 442/442-A             Presenting Problem: R TKR    Problem List  Principal Problem:    Osteoarthritis of right knee  Active Problems:    Dyslipidemia    Parkinson disease ([facility] [address]    Preop test Fair -  Dynamic Standing: Poor +    ACTIVITY TOLERANCE  Pulse: 82  Heart Rate Source: Monitor     BP: 125/50  BP Location: Right arm  BP Method: Automatic  Patient Position: Sitting(No c/o dizziness)    O2 WALK        SPO2 Ambulation on Oxygen: 95 aware of session/findings; All patient questions and concerns addressed; Ice applied; Family present; Alarm set    CURRENT GOALS  Goals to be met by: 11/20/18  Patient Goal Patient's self-stated goal is: \"go to rehab after this\"   Goal #1 Patient is able to

## 2018-11-09 VITALS
DIASTOLIC BLOOD PRESSURE: 53 MMHG | HEART RATE: 75 BPM | SYSTOLIC BLOOD PRESSURE: 134 MMHG | WEIGHT: 130 LBS | RESPIRATION RATE: 18 BRPM | HEIGHT: 64.5 IN | TEMPERATURE: 98 F | OXYGEN SATURATION: 93 % | BODY MASS INDEX: 21.93 KG/M2

## 2018-11-09 PROCEDURE — 99239 HOSP IP/OBS DSCHRG MGMT >30: CPT | Performed by: HOSPITALIST

## 2018-11-09 RX ORDER — POLYETHYLENE GLYCOL 3350 17 G/17G
17 POWDER, FOR SOLUTION ORAL DAILY PRN
Qty: 17 G | Refills: 0 | Status: SHIPPED | OUTPATIENT
Start: 2018-11-09 | End: 2019-09-03

## 2018-11-09 NOTE — PHYSICAL THERAPY NOTE
PHYSICAL THERAPY KNEE TREATMENT NOTE - INPATIENT     Room Number: 442/442-A             Presenting Problem: R TKR    Problem List  Principal Problem:    Osteoarthritis of right knee  Active Problems:    Dyslipidemia    Parkinson disease ([facility] [address]    Preop test from lying on back to sitting on the side of the bed?: A Little   How much help from another person does the patient currently need. ..   -   Moving to and from a bed to a chair (including a wheelchair)?: A Little   -   Need to walk in hospital room?: A Lit Goal #4   Current Status  not tested   Goal #5  AROM 0 degrees extension to 95 degrees flexion     Goal #5   Current Status AROM 5-80 degrees   Goal #6 Patient independently performs home exercise program for ROM/strengthening per the instructions provid

## 2018-11-09 NOTE — CM/SW NOTE
The pt. Is scheduled to discharge to [facility] today 11/9 at 2p, via [address]. The pt's dtr. Amol Esquivel will be calling in payment for the medicar. ([employer] quote $33) The pt. Is aware and agreeable.       Report [address]

## 2018-11-09 NOTE — PLAN OF CARE
Problem: Patient Centered Care  Goal: Patient preferences are identified and integrated in the patient's plan of care  Interventions:  - What would you like us to know as we care for you?  \"This is the first time I have had a big surgery\"  - Provide timel injury  INTERVENTIONS:  - Assess pt frequently for physical needs  - Identify cognitive and physical deficits and behaviors that affect risk of falls.   - [place] fall precautions as indicated by assessment.  - Educate pt/family on patient safety includin

## 2018-11-10 NOTE — DISCHARGE SUMMARY
[place] [facility] - [facility]    Discharge Summary    Rajesh Mcclain Patient Status:  Inpatient    1935 MRN [id number]   Location [facility] 4W/SW/SE Attending No att. providers found   [facility] Day # 3 PCP Ludy Myers MD, MD     Date of PT/OT.        Dyslipidemia  CONT HOME MEDS.         Parkinson disease ([facility] [address]  CONT [facility]      Hypotension - improved. Saline lock for now. Urine studies as below      HISTORY OF THIRD DEGREE HB, PACEMAKER.      Hyponatremia - 2/2 ivf's.  Improved with fluid the same name was added. Make sure you understand how and when to take each. Take 17 g by mouth daily. Refills:  0     PEG 3350 Pack  Commonly known as:  MIRALAX  What changed:   You were already taking a medication with the same name, and this presc tablet  Refills:  3     THERA/BETA-CAROTENE Tabs      Take 1 tablet by mouth daily.    Refills:  0           Where to Get Your Medications      These medications were sent to [address] [place] [address]/[facility], [address] [place], [place] [address] [facility] [phone], [phone]

## 2018-11-13 ENCOUNTER — SNF VISIT (OUTPATIENT)
Dept: INTERNAL MEDICINE CLINIC | Facility: SKILLED NURSING FACILITY | Age: 83
End: 2018-11-13

## 2018-11-13 DIAGNOSIS — R53.1 WEAKNESS: ICD-10-CM

## 2018-11-13 DIAGNOSIS — G20 PARKINSON DISEASE (HCC): Chronic | ICD-10-CM

## 2018-11-13 DIAGNOSIS — Z96.651 STATUS POST TOTAL RIGHT KNEE REPLACEMENT: ICD-10-CM

## 2018-11-13 PROCEDURE — 99309 SBSQ NF CARE MODERATE MDM 30: CPT | Performed by: NURSE PRACTITIONER

## 2018-11-13 NOTE — PROGRESS NOTES
HPI: Anna Wiseman  is an 80 yo female who was admitted to [address] [place] [address] for R TKA by Dr. Awais Renner. She was discharged to [facility] for [address].      Chief complaint: s/p R TKA     Past Medical History:   Diagnosis Date   • Abscess of tonsil     per NextGen: x2   • A MEDICATIONS: Reviewed on [facility] EMR  VITALS: Reviewed   LABS/Imaging: Reviewed     REVIEW OF SYSTEMS: Seen and examine d in room. Reports no sob/cough/cp/palpitations. Denies hematuria/dysuria/frequency. Reports normal BMs.         PHYSICAL EXAM:  GENERAL HEAL

## 2018-11-19 ENCOUNTER — SNF VISIT (OUTPATIENT)
Dept: INTERNAL MEDICINE CLINIC | Facility: SKILLED NURSING FACILITY | Age: 83
End: 2018-11-19

## 2018-11-19 DIAGNOSIS — G20 PARKINSON DISEASE (HCC): Chronic | ICD-10-CM

## 2018-11-19 DIAGNOSIS — Z96.651 STATUS POST TOTAL RIGHT KNEE REPLACEMENT: ICD-10-CM

## 2018-11-19 PROCEDURE — 99308 SBSQ NF CARE LOW MDM 20: CPT | Performed by: NURSE PRACTITIONER

## 2018-11-19 NOTE — PROGRESS NOTES
HPI: Bulmaro Titus  is an 80 yo female who was admitted to [address] [place] [address] for R TKA by Dr. Mekhi Conte.  She was discharged to [facility] for [address].      Chief complaint: f/u R TKA      Past Medical History        Past Medical History:   Diagnosis Date   • Abscess of to continue elevating and wearing tubigrips during the day.            PHYSICAL EXAM:  GENERAL HEALTH: NAD  HEENT: atraumatic/normocephalic, mucous membranes pink and moist, PERRL, EOMI, sclera anicteric, conjunctiva normal.   RESPIRATORY:CTAB  CARDIOVASCULAR

## 2019-03-11 ENCOUNTER — HOSPITAL ENCOUNTER (OUTPATIENT)
Dept: BONE DENSITY | Age: 84
Discharge: HOME | End: 2019-03-11
Attending: INTERNAL MEDICINE
Payer: MEDICARE

## 2019-03-11 DIAGNOSIS — Z78.0 MENOPAUSE: ICD-10-CM

## 2019-03-11 PROCEDURE — 77080 DXA BONE DENSITY AXIAL: CPT | Performed by: INTERNAL MEDICINE

## 2019-06-07 ENCOUNTER — OFFICE VISIT (OUTPATIENT)
Dept: OTOLARYNGOLOGY | Facility: CLINIC | Age: 84
End: 2019-06-07
Payer: MEDICARE

## 2019-06-07 VITALS — TEMPERATURE: 99 F | SYSTOLIC BLOOD PRESSURE: 128 MMHG | DIASTOLIC BLOOD PRESSURE: 77 MMHG

## 2019-06-07 DIAGNOSIS — H92.03 OTALGIA OF BOTH EARS: Primary | ICD-10-CM

## 2019-06-07 PROCEDURE — G0463 HOSPITAL OUTPT CLINIC VISIT: HCPCS | Performed by: OTOLARYNGOLOGY

## 2019-06-07 PROCEDURE — 99213 OFFICE O/P EST LOW 20 MIN: CPT | Performed by: OTOLARYNGOLOGY

## 2019-06-10 NOTE — PROGRESS NOTES
Darling Sommers is a 81year old female. Patient presents with:  Ear Problem: pt presents with left ear ache, itchiness in left ear     HPI:   She has been experiencing intermittent pain in her ears left greater than right.   She feels a fullness and bloc mouth daily. Disp:  Rfl:    Methylcellulose, Laxative, (CITRUCEL) 500 MG Oral Tab Take 500 mg by mouth daily. Disp:  Rfl:    HYDROcodone-acetaminophen 5-325 MG Oral Tab Take 1 tablet by mouth every 4 (four) hours as needed.  Disp: 50 tablet Rfl: 0   claudette Normal. Nasal septum - Normal, Turbinates - Normal   Neurological Normal Memory - Normal. Cranial nerves - Cranial nerves II through XII grossly intact.    Neck Exam Normal Inspection - Normal. Palpation - Normal. Parotid gland - Normal. Thyroid gland - Nor

## 2019-07-18 ENCOUNTER — HOSPITAL ENCOUNTER (OUTPATIENT)
Dept: CT IMAGING | Facility: HOSPITAL | Age: 84
Discharge: HOME | End: 2019-07-18
Attending: ORTHOPAEDIC SURGERY
Payer: MEDICARE

## 2019-07-18 DIAGNOSIS — M17.12 OSTEOARTHRITIS OF LEFT KNEE: ICD-10-CM

## 2019-07-18 PROCEDURE — 73700 CT LOWER EXTREMITY W/O DYE: CPT | Performed by: ORTHOPAEDIC SURGERY

## 2019-09-03 RX ORDER — RANITIDINE 150 MG/1
150 CAPSULE ORAL DAILY
Status: ON HOLD | COMMUNITY
End: 2019-09-13

## 2019-09-03 RX ORDER — SENNOSIDES 8.6 MG
8.6 TABLET ORAL NIGHTLY
COMMUNITY

## 2019-09-05 ENCOUNTER — LAB ENCOUNTER (OUTPATIENT)
Dept: LAB | Age: 84
End: 2019-09-05
Attending: ORTHOPAEDIC SURGERY
Payer: MEDICARE

## 2019-09-05 DIAGNOSIS — Z01.818 PRE-OP TESTING: ICD-10-CM

## 2019-09-05 LAB
ANTIBODY SCREEN: NEGATIVE
APTT PPP: 32.3 SECONDS (ref 23.2–35.3)
INR BLD: 1 (ref 0.9–1.2)
PROTHROMBIN TIME: 13 SECONDS (ref 11.8–14.5)
RH BLOOD TYPE: POSITIVE

## 2019-09-05 PROCEDURE — 86850 RBC ANTIBODY SCREEN: CPT

## 2019-09-05 PROCEDURE — 85610 PROTHROMBIN TIME: CPT

## 2019-09-05 PROCEDURE — 36415 COLL VENOUS BLD VENIPUNCTURE: CPT

## 2019-09-05 PROCEDURE — 86900 BLOOD TYPING SEROLOGIC ABO: CPT

## 2019-09-05 PROCEDURE — 85730 THROMBOPLASTIN TIME PARTIAL: CPT

## 2019-09-05 PROCEDURE — 86901 BLOOD TYPING SEROLOGIC RH(D): CPT

## 2019-09-06 NOTE — H&P
[facility]    History & Physical    Fadia Mcclain Patient Status:  Surgery Admit Ny Gregory    1935 MRN [id number]   [place] [address] Attending Barrera Cortes, *   Hosp Day # 0 PCP [facility] 50        stomach cancer    • Cancer Other         NO family h/o bladder or kidney cancer   • Kidney Disease Other         NO family h/o urolithiasis     Social History:  Social History    Tobacco Use      Smoking status: Former Smoker        Packs/day: 0. 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 Assessment/Plan:   In summary, Ms. Joesph Mott has advanced osteoarthritis of her left knee. We discussed the options for treatment. She would like to proceed with left total knee arthroplasty.  She's been med

## 2019-09-10 ENCOUNTER — APPOINTMENT (OUTPATIENT)
Dept: GENERAL RADIOLOGY | Facility: HOSPITAL | Age: 84
DRG: 470 | End: 2019-09-10
Attending: PHYSICIAN ASSISTANT
Payer: MEDICARE

## 2019-09-10 ENCOUNTER — ANESTHESIA (OUTPATIENT)
Dept: SURGERY | Facility: HOSPITAL | Age: 84
DRG: 470 | End: 2019-09-10
Payer: MEDICARE

## 2019-09-10 ENCOUNTER — ANESTHESIA EVENT (OUTPATIENT)
Dept: SURGERY | Facility: HOSPITAL | Age: 84
DRG: 470 | End: 2019-09-10
Payer: MEDICARE

## 2019-09-10 ENCOUNTER — HOSPITAL ENCOUNTER (INPATIENT)
Facility: HOSPITAL | Age: 84
LOS: 3 days | Discharge: NURSING, REHAB OR LONG-TERM CARE | DRG: 470 | End: 2019-09-13
Attending: ORTHOPAEDIC SURGERY | Admitting: ORTHOPAEDIC SURGERY
Payer: MEDICARE

## 2019-09-10 DIAGNOSIS — Z01.818 PRE-OP TESTING: Primary | ICD-10-CM

## 2019-09-10 PROBLEM — M17.12 OSTEOARTHRITIS OF LEFT KNEE: Status: ACTIVE | Noted: 2019-09-10

## 2019-09-10 PROCEDURE — 0SRD0J9 REPLACEMENT OF LEFT KNEE JOINT WITH SYNTHETIC SUBSTITUTE, CEMENTED, OPEN APPROACH: ICD-10-PCS | Performed by: ORTHOPAEDIC SURGERY

## 2019-09-10 PROCEDURE — 73560 X-RAY EXAM OF KNEE 1 OR 2: CPT | Performed by: PHYSICIAN ASSISTANT

## 2019-09-10 PROCEDURE — 99232 SBSQ HOSP IP/OBS MODERATE 35: CPT | Performed by: HOSPITALIST

## 2019-09-10 DEVICE — FEMUR SPHERE CEMENTED LEFT, SIZE 3+
Type: IMPLANTABLE DEVICE | Site: KNEE | Status: FUNCTIONAL
Brand: GMK SPHERE TOTAL KNEE SYSTEM

## 2019-09-10 DEVICE — BIOMET BC R 1X40 US: Type: IMPLANTABLE DEVICE | Site: KNEE | Status: FUNCTIONAL

## 2019-09-10 DEVICE — FIXED TIBIAL TRAY CEMENTED LEFT, SIZE 3
Type: IMPLANTABLE DEVICE | Site: KNEE | Status: FUNCTIONAL
Brand: GMK PRIMARY TOTAL KNEE SYSTEM

## 2019-09-10 DEVICE — RESURFACING PATELLA SIZE 2
Type: IMPLANTABLE DEVICE | Site: KNEE | Status: FUNCTIONAL
Brand: GMK PRIMARY TOTAL KNEE SYSTEM

## 2019-09-10 RX ORDER — DIPHENHYDRAMINE HYDROCHLORIDE 50 MG/ML
12.5 INJECTION INTRAMUSCULAR; INTRAVENOUS EVERY 4 HOURS PRN
Status: ACTIVE | OUTPATIENT
Start: 2019-09-10 | End: 2019-09-11

## 2019-09-10 RX ORDER — GABAPENTIN 300 MG/1
300 CAPSULE ORAL NIGHTLY
Status: DISCONTINUED | OUTPATIENT
Start: 2019-09-10 | End: 2019-09-13

## 2019-09-10 RX ORDER — ONDANSETRON 2 MG/ML
INJECTION INTRAMUSCULAR; INTRAVENOUS AS NEEDED
Status: DISCONTINUED | OUTPATIENT
Start: 2019-09-10 | End: 2019-09-10 | Stop reason: SURG

## 2019-09-10 RX ORDER — DOCUSATE SODIUM 100 MG/1
100 CAPSULE, LIQUID FILLED ORAL 2 TIMES DAILY
Status: DISCONTINUED | OUTPATIENT
Start: 2019-09-10 | End: 2019-09-13

## 2019-09-10 RX ORDER — MAGNESIUM OXIDE 400 MG (241.3 MG MAGNESIUM) TABLET
3 TABLET NIGHTLY
Status: DISCONTINUED | OUTPATIENT
Start: 2019-09-10 | End: 2019-09-13

## 2019-09-10 RX ORDER — DEXTROSE, SODIUM CHLORIDE, AND POTASSIUM CHLORIDE 5; .45; .15 G/100ML; G/100ML; G/100ML
INJECTION INTRAVENOUS CONTINUOUS
Status: DISCONTINUED | OUTPATIENT
Start: 2019-09-10 | End: 2019-09-13

## 2019-09-10 RX ORDER — HYDROCODONE BITARTRATE AND ACETAMINOPHEN 5; 325 MG/1; MG/1
1 TABLET ORAL EVERY 4 HOURS PRN
Status: DISCONTINUED | OUTPATIENT
Start: 2019-09-10 | End: 2019-09-13

## 2019-09-10 RX ORDER — FAMOTIDINE 10 MG/ML
20 INJECTION, SOLUTION INTRAVENOUS 2 TIMES DAILY
Status: DISCONTINUED | OUTPATIENT
Start: 2019-09-10 | End: 2019-09-10

## 2019-09-10 RX ORDER — ACETAMINOPHEN 325 MG/1
650 TABLET ORAL EVERY 4 HOURS PRN
Status: DISCONTINUED | OUTPATIENT
Start: 2019-09-10 | End: 2019-09-13

## 2019-09-10 RX ORDER — HALOPERIDOL 5 MG/ML
0.5 INJECTION INTRAMUSCULAR ONCE AS NEEDED
Status: ACTIVE | OUTPATIENT
Start: 2019-09-10 | End: 2019-09-10

## 2019-09-10 RX ORDER — SODIUM PHOSPHATE, DIBASIC AND SODIUM PHOSPHATE, MONOBASIC 7; 19 G/133ML; G/133ML
1 ENEMA RECTAL ONCE AS NEEDED
Status: DISCONTINUED | OUTPATIENT
Start: 2019-09-10 | End: 2019-09-13

## 2019-09-10 RX ORDER — SENNOSIDES 8.6 MG
17.2 TABLET ORAL NIGHTLY
Status: DISCONTINUED | OUTPATIENT
Start: 2019-09-10 | End: 2019-09-13

## 2019-09-10 RX ORDER — METOCLOPRAMIDE HYDROCHLORIDE 5 MG/ML
10 INJECTION INTRAMUSCULAR; INTRAVENOUS EVERY 6 HOURS PRN
Status: DISPENSED | OUTPATIENT
Start: 2019-09-10 | End: 2019-09-12

## 2019-09-10 RX ORDER — PANTOPRAZOLE SODIUM 40 MG/1
40 TABLET, DELAYED RELEASE ORAL
Status: DISCONTINUED | OUTPATIENT
Start: 2019-09-11 | End: 2019-09-13

## 2019-09-10 RX ORDER — CEFAZOLIN SODIUM/WATER 2 G/20 ML
2 SYRINGE (ML) INTRAVENOUS ONCE
Status: COMPLETED | OUTPATIENT
Start: 2019-09-10 | End: 2019-09-10

## 2019-09-10 RX ORDER — SENNOSIDES 8.6 MG
8.6 TABLET ORAL NIGHTLY
Status: DISCONTINUED | OUTPATIENT
Start: 2019-09-10 | End: 2019-09-10

## 2019-09-10 RX ORDER — HYDROMORPHONE HYDROCHLORIDE 1 MG/ML
0.6 INJECTION, SOLUTION INTRAMUSCULAR; INTRAVENOUS; SUBCUTANEOUS EVERY 5 MIN PRN
Status: DISCONTINUED | OUTPATIENT
Start: 2019-09-10 | End: 2019-09-10 | Stop reason: HOSPADM

## 2019-09-10 RX ORDER — POLYETHYLENE GLYCOL 3350 17 G/17G
17 POWDER, FOR SOLUTION ORAL DAILY PRN
Status: DISCONTINUED | OUTPATIENT
Start: 2019-09-10 | End: 2019-09-13

## 2019-09-10 RX ORDER — MORPHINE SULFATE 4 MG/ML
4 INJECTION, SOLUTION INTRAMUSCULAR; INTRAVENOUS EVERY 10 MIN PRN
Status: DISCONTINUED | OUTPATIENT
Start: 2019-09-10 | End: 2019-09-10 | Stop reason: HOSPADM

## 2019-09-10 RX ORDER — HYDROMORPHONE HYDROCHLORIDE 1 MG/ML
0.4 INJECTION, SOLUTION INTRAMUSCULAR; INTRAVENOUS; SUBCUTANEOUS EVERY 5 MIN PRN
Status: DISCONTINUED | OUTPATIENT
Start: 2019-09-10 | End: 2019-09-10 | Stop reason: HOSPADM

## 2019-09-10 RX ORDER — HYDROCODONE BITARTRATE AND ACETAMINOPHEN 5; 325 MG/1; MG/1
2 TABLET ORAL EVERY 4 HOURS PRN
Status: DISCONTINUED | OUTPATIENT
Start: 2019-09-10 | End: 2019-09-13

## 2019-09-10 RX ORDER — MORPHINE SULFATE 2 MG/ML
2 INJECTION, SOLUTION INTRAMUSCULAR; INTRAVENOUS EVERY 2 HOUR PRN
Status: DISCONTINUED | OUTPATIENT
Start: 2019-09-10 | End: 2019-09-13

## 2019-09-10 RX ORDER — MIDAZOLAM HYDROCHLORIDE 1 MG/ML
INJECTION INTRAMUSCULAR; INTRAVENOUS AS NEEDED
Status: DISCONTINUED | OUTPATIENT
Start: 2019-09-10 | End: 2019-09-10 | Stop reason: SURG

## 2019-09-10 RX ORDER — DEXAMETHASONE SODIUM PHOSPHATE 4 MG/ML
VIAL (ML) INJECTION AS NEEDED
Status: DISCONTINUED | OUTPATIENT
Start: 2019-09-10 | End: 2019-09-10 | Stop reason: SURG

## 2019-09-10 RX ORDER — HALOPERIDOL 5 MG/ML
0.25 INJECTION INTRAMUSCULAR ONCE AS NEEDED
Status: DISCONTINUED | OUTPATIENT
Start: 2019-09-10 | End: 2019-09-10 | Stop reason: HOSPADM

## 2019-09-10 RX ORDER — FAMOTIDINE 20 MG/1
20 TABLET ORAL 2 TIMES DAILY
Status: DISCONTINUED | OUTPATIENT
Start: 2019-09-10 | End: 2019-09-10

## 2019-09-10 RX ORDER — DIPHENHYDRAMINE HYDROCHLORIDE 50 MG/ML
25 INJECTION INTRAMUSCULAR; INTRAVENOUS ONCE AS NEEDED
Status: ACTIVE | OUTPATIENT
Start: 2019-09-10 | End: 2019-09-10

## 2019-09-10 RX ORDER — DIPHENHYDRAMINE HCL 25 MG
25 CAPSULE ORAL EVERY 4 HOURS PRN
Status: DISCONTINUED | OUTPATIENT
Start: 2019-09-10 | End: 2019-09-13

## 2019-09-10 RX ORDER — MORPHINE SULFATE 4 MG/ML
2 INJECTION, SOLUTION INTRAMUSCULAR; INTRAVENOUS EVERY 10 MIN PRN
Status: DISCONTINUED | OUTPATIENT
Start: 2019-09-10 | End: 2019-09-10 | Stop reason: HOSPADM

## 2019-09-10 RX ORDER — FAMOTIDINE 20 MG/1
20 TABLET ORAL ONCE
Status: DISCONTINUED | OUTPATIENT
Start: 2019-09-10 | End: 2019-09-10 | Stop reason: HOSPADM

## 2019-09-10 RX ORDER — NALOXONE HYDROCHLORIDE 0.4 MG/ML
80 INJECTION, SOLUTION INTRAMUSCULAR; INTRAVENOUS; SUBCUTANEOUS AS NEEDED
Status: DISCONTINUED | OUTPATIENT
Start: 2019-09-10 | End: 2019-09-10 | Stop reason: HOSPADM

## 2019-09-10 RX ORDER — ACETAMINOPHEN 500 MG
1000 TABLET ORAL ONCE
Status: COMPLETED | OUTPATIENT
Start: 2019-09-10 | End: 2019-09-10

## 2019-09-10 RX ORDER — ASPIRIN 325 MG
325 TABLET ORAL 2 TIMES DAILY
Status: DISCONTINUED | OUTPATIENT
Start: 2019-09-10 | End: 2019-09-13

## 2019-09-10 RX ORDER — ONDANSETRON 2 MG/ML
4 INJECTION INTRAMUSCULAR; INTRAVENOUS EVERY 4 HOURS PRN
Status: DISPENSED | OUTPATIENT
Start: 2019-09-10 | End: 2019-09-12

## 2019-09-10 RX ORDER — ONDANSETRON 2 MG/ML
4 INJECTION INTRAMUSCULAR; INTRAVENOUS ONCE AS NEEDED
Status: ACTIVE | OUTPATIENT
Start: 2019-09-10 | End: 2019-09-10

## 2019-09-10 RX ORDER — CELECOXIB 200 MG/1
400 CAPSULE ORAL ONCE
Status: DISCONTINUED | OUTPATIENT
Start: 2019-09-10 | End: 2019-09-10

## 2019-09-10 RX ORDER — NALBUPHINE HCL 10 MG/ML
2.5 AMPUL (ML) INJECTION EVERY 4 HOURS PRN
Status: ACTIVE | OUTPATIENT
Start: 2019-09-10 | End: 2019-09-11

## 2019-09-10 RX ORDER — HYDROMORPHONE HYDROCHLORIDE 1 MG/ML
0.4 INJECTION, SOLUTION INTRAMUSCULAR; INTRAVENOUS; SUBCUTANEOUS
Status: ACTIVE | OUTPATIENT
Start: 2019-09-10 | End: 2019-09-11

## 2019-09-10 RX ORDER — HYDROCODONE BITARTRATE AND ACETAMINOPHEN 5; 325 MG/1; MG/1
2 TABLET ORAL AS NEEDED
Status: DISCONTINUED | OUTPATIENT
Start: 2019-09-10 | End: 2019-09-10 | Stop reason: HOSPADM

## 2019-09-10 RX ORDER — SODIUM CHLORIDE, SODIUM LACTATE, POTASSIUM CHLORIDE, CALCIUM CHLORIDE 600; 310; 30; 20 MG/100ML; MG/100ML; MG/100ML; MG/100ML
INJECTION, SOLUTION INTRAVENOUS CONTINUOUS
Status: DISCONTINUED | OUTPATIENT
Start: 2019-09-10 | End: 2019-09-13

## 2019-09-10 RX ORDER — HYDROMORPHONE HYDROCHLORIDE 1 MG/ML
0.6 INJECTION, SOLUTION INTRAMUSCULAR; INTRAVENOUS; SUBCUTANEOUS
Status: ACTIVE | OUTPATIENT
Start: 2019-09-10 | End: 2019-09-11

## 2019-09-10 RX ORDER — METOCLOPRAMIDE 10 MG/1
10 TABLET ORAL ONCE
Status: DISCONTINUED | OUTPATIENT
Start: 2019-09-10 | End: 2019-09-10 | Stop reason: HOSPADM

## 2019-09-10 RX ORDER — MORPHINE SULFATE 4 MG/ML
4 INJECTION, SOLUTION INTRAMUSCULAR; INTRAVENOUS EVERY 2 HOUR PRN
Status: DISCONTINUED | OUTPATIENT
Start: 2019-09-10 | End: 2019-09-13

## 2019-09-10 RX ORDER — PROCHLORPERAZINE EDISYLATE 5 MG/ML
10 INJECTION INTRAMUSCULAR; INTRAVENOUS EVERY 6 HOURS PRN
Status: ACTIVE | OUTPATIENT
Start: 2019-09-10 | End: 2019-09-12

## 2019-09-10 RX ORDER — POLYETHYLENE GLYCOL 3350 17 G/17G
17 POWDER, FOR SOLUTION ORAL DAILY PRN
Status: DISCONTINUED | OUTPATIENT
Start: 2019-09-10 | End: 2019-09-10

## 2019-09-10 RX ORDER — BUPIVACAINE HYDROCHLORIDE 7.5 MG/ML
INJECTION, SOLUTION INTRASPINAL
Status: COMPLETED | OUTPATIENT
Start: 2019-09-10 | End: 2019-09-10

## 2019-09-10 RX ORDER — HYDROMORPHONE HYDROCHLORIDE 1 MG/ML
0.2 INJECTION, SOLUTION INTRAMUSCULAR; INTRAVENOUS; SUBCUTANEOUS EVERY 5 MIN PRN
Status: DISCONTINUED | OUTPATIENT
Start: 2019-09-10 | End: 2019-09-10 | Stop reason: HOSPADM

## 2019-09-10 RX ORDER — MELATONIN
325
Status: DISCONTINUED | OUTPATIENT
Start: 2019-09-11 | End: 2019-09-13

## 2019-09-10 RX ORDER — MORPHINE SULFATE 1 MG/ML
INJECTION, SOLUTION EPIDURAL; INTRATHECAL; INTRAVENOUS
Status: COMPLETED | OUTPATIENT
Start: 2019-09-10 | End: 2019-09-10

## 2019-09-10 RX ORDER — PROCHLORPERAZINE EDISYLATE 5 MG/ML
5 INJECTION INTRAMUSCULAR; INTRAVENOUS ONCE AS NEEDED
Status: ACTIVE | OUTPATIENT
Start: 2019-09-10 | End: 2019-09-10

## 2019-09-10 RX ORDER — ACETAMINOPHEN 500 MG
500 TABLET ORAL EVERY 6 HOURS PRN
Status: ON HOLD | COMMUNITY
End: 2019-09-13

## 2019-09-10 RX ORDER — MORPHINE SULFATE 2 MG/ML
1 INJECTION, SOLUTION INTRAMUSCULAR; INTRAVENOUS EVERY 2 HOUR PRN
Status: DISCONTINUED | OUTPATIENT
Start: 2019-09-10 | End: 2019-09-13

## 2019-09-10 RX ORDER — SODIUM CHLORIDE 0.9 % (FLUSH) 0.9 %
10 SYRINGE (ML) INJECTION AS NEEDED
Status: DISCONTINUED | OUTPATIENT
Start: 2019-09-10 | End: 2019-09-13

## 2019-09-10 RX ORDER — MELATONIN
3 NIGHTLY
COMMUNITY

## 2019-09-10 RX ORDER — DIPHENHYDRAMINE HYDROCHLORIDE 50 MG/ML
12.5 INJECTION INTRAMUSCULAR; INTRAVENOUS EVERY 4 HOURS PRN
Status: DISCONTINUED | OUTPATIENT
Start: 2019-09-10 | End: 2019-09-13

## 2019-09-10 RX ORDER — HYDROCODONE BITARTRATE AND ACETAMINOPHEN 5; 325 MG/1; MG/1
1 TABLET ORAL AS NEEDED
Status: DISCONTINUED | OUTPATIENT
Start: 2019-09-10 | End: 2019-09-10 | Stop reason: HOSPADM

## 2019-09-10 RX ORDER — NALOXONE HYDROCHLORIDE 0.4 MG/ML
0.08 INJECTION, SOLUTION INTRAMUSCULAR; INTRAVENOUS; SUBCUTANEOUS
Status: ACTIVE | OUTPATIENT
Start: 2019-09-10 | End: 2019-09-11

## 2019-09-10 RX ORDER — LIDOCAINE HYDROCHLORIDE 10 MG/ML
INJECTION, SOLUTION INFILTRATION; PERINEURAL
Status: COMPLETED | OUTPATIENT
Start: 2019-09-10 | End: 2019-09-10

## 2019-09-10 RX ORDER — HYDROCODONE BITARTRATE AND ACETAMINOPHEN 7.5; 325 MG/1; MG/1
1 TABLET ORAL EVERY 6 HOURS PRN
Status: ACTIVE | OUTPATIENT
Start: 2019-09-10 | End: 2019-09-11

## 2019-09-10 RX ORDER — FAMOTIDINE 20 MG/1
20 TABLET ORAL DAILY
Status: DISCONTINUED | OUTPATIENT
Start: 2019-09-11 | End: 2019-09-10

## 2019-09-10 RX ORDER — GABAPENTIN 600 MG/1
600 TABLET ORAL ONCE
Status: COMPLETED | OUTPATIENT
Start: 2019-09-10 | End: 2019-09-10

## 2019-09-10 RX ORDER — ROSUVASTATIN CALCIUM 10 MG/1
10 TABLET, COATED ORAL DAILY
Status: DISCONTINUED | OUTPATIENT
Start: 2019-09-11 | End: 2019-09-13

## 2019-09-10 RX ORDER — BISACODYL 10 MG
10 SUPPOSITORY, RECTAL RECTAL
Status: DISCONTINUED | OUTPATIENT
Start: 2019-09-10 | End: 2019-09-13

## 2019-09-10 RX ORDER — ACETAMINOPHEN 325 MG/1
650 TABLET ORAL EVERY 6 HOURS PRN
Status: ACTIVE | OUTPATIENT
Start: 2019-09-10 | End: 2019-09-11

## 2019-09-10 RX ORDER — PROCHLORPERAZINE EDISYLATE 5 MG/ML
5 INJECTION INTRAMUSCULAR; INTRAVENOUS ONCE AS NEEDED
Status: DISCONTINUED | OUTPATIENT
Start: 2019-09-10 | End: 2019-09-10 | Stop reason: HOSPADM

## 2019-09-10 RX ORDER — HYDROCODONE BITARTRATE AND ACETAMINOPHEN 7.5; 325 MG/1; MG/1
2 TABLET ORAL EVERY 6 HOURS PRN
Status: ACTIVE | OUTPATIENT
Start: 2019-09-10 | End: 2019-09-11

## 2019-09-10 RX ORDER — DIPHENHYDRAMINE HCL 25 MG
25 CAPSULE ORAL EVERY 4 HOURS PRN
Status: ACTIVE | OUTPATIENT
Start: 2019-09-10 | End: 2019-09-11

## 2019-09-10 RX ORDER — SODIUM CHLORIDE, SODIUM LACTATE, POTASSIUM CHLORIDE, CALCIUM CHLORIDE 600; 310; 30; 20 MG/100ML; MG/100ML; MG/100ML; MG/100ML
INJECTION, SOLUTION INTRAVENOUS CONTINUOUS
Status: DISCONTINUED | OUTPATIENT
Start: 2019-09-10 | End: 2019-09-10 | Stop reason: HOSPADM

## 2019-09-10 RX ORDER — ACETAMINOPHEN 500 MG
1000 TABLET ORAL ONCE
Status: DISCONTINUED | OUTPATIENT
Start: 2019-09-10 | End: 2019-09-10

## 2019-09-10 RX ORDER — ONDANSETRON 2 MG/ML
4 INJECTION INTRAMUSCULAR; INTRAVENOUS ONCE AS NEEDED
Status: DISCONTINUED | OUTPATIENT
Start: 2019-09-10 | End: 2019-09-10 | Stop reason: HOSPADM

## 2019-09-10 RX ORDER — MORPHINE SULFATE 10 MG/ML
6 INJECTION, SOLUTION INTRAMUSCULAR; INTRAVENOUS EVERY 10 MIN PRN
Status: DISCONTINUED | OUTPATIENT
Start: 2019-09-10 | End: 2019-09-10 | Stop reason: HOSPADM

## 2019-09-10 RX ADMIN — CEFAZOLIN SODIUM/WATER 2 G: 2 G/20 ML SYRINGE (ML) INTRAVENOUS at 11:57:00

## 2019-09-10 RX ADMIN — BUPIVACAINE HYDROCHLORIDE 1.4 ML: 7.5 INJECTION, SOLUTION INTRASPINAL at 11:40:00

## 2019-09-10 RX ADMIN — MIDAZOLAM HYDROCHLORIDE 2 MG: 1 INJECTION INTRAMUSCULAR; INTRAVENOUS at 11:43:00

## 2019-09-10 RX ADMIN — MORPHINE SULFATE 0.2 MG: 1 INJECTION, SOLUTION EPIDURAL; INTRATHECAL; INTRAVENOUS at 11:40:00

## 2019-09-10 RX ADMIN — SODIUM CHLORIDE, SODIUM LACTATE, POTASSIUM CHLORIDE, CALCIUM CHLORIDE: 600; 310; 30; 20 INJECTION, SOLUTION INTRAVENOUS at 11:41:00

## 2019-09-10 RX ADMIN — ONDANSETRON 4 MG: 2 INJECTION INTRAMUSCULAR; INTRAVENOUS at 12:09:00

## 2019-09-10 RX ADMIN — DEXAMETHASONE SODIUM PHOSPHATE 4 MG: 4 MG/ML VIAL (ML) INJECTION at 12:09:00

## 2019-09-10 RX ADMIN — LIDOCAINE HYDROCHLORIDE 5 ML: 10 INJECTION, SOLUTION INFILTRATION; PERINEURAL at 11:40:00

## 2019-09-10 NOTE — PHYSICAL THERAPY NOTE
PHYSICAL THERAPY KNEE EVALUATION - INPATIENT       Room Number: 568/928-G  Evaluation Date: 9/10/2019  Type of Evaluation: Initial  Physician Order: PT Eval and Treat    Presenting Problem: L TKA  Reason for Therapy: Mobility Dysfunction and Discharge Plan Problem:    Osteoarthritis of left knee  Active Problems:    Dyslipidemia    Parkinson disease ([facility] [address]    Pre-op testing      Past Medical History  Past Medical History:   Diagnosis Date   • Abscess of tonsil     per NextGen: x2   • AV node dysfunction    • s/p L TKA    Lower extremity strength is within functional limits except for the following:  L LE s/p L TKA    BALANCE  Static Sitting: Good  Dynamic Sitting: Good  Static Standin [address]  Dynamic Standing: Fair - Status    Goal #2 Patient is able to demonstrate transfers Sit to/from Stand at assistance level: modified independent     Goal #2  Current Status    Goal #3 Patient is able to ambulate 300 feet with assistive device at assistance level: modified independe

## 2019-09-10 NOTE — BRIEF OP NOTE
Pre-Operative Diagnosis: osteoarthritis left knee     Post-Operative Diagnosis: osteoarthritis left knee      Procedure Performed:   Procedure(s):  left total knee arthroplasty    Surgeon(s) and Role:     * Lamonte Whyte MD - Primary    Assistant

## 2019-09-10 NOTE — PROGRESS NOTES
[facility]    Progress Note    Gerson Single Patient Status:  Inpatient    1935 MRN [id number]   Location [facility] 4W/SW/SE Attending Mic Briggs MD   Hosp Day # 0 PCP Won Chino MD, MD        Subjective: ([facility] [address]  CONT HOME MEDS.              Results:     Lab Results   Component Value Date    WBC 4.8 11/20/2018    HGB 10.3 (L) 11/20/2018    HCT 30.4 (L) 11/20/2018     11/20/2018    CREATSERUM 0.70 11/20/2018    BUN 20 11/20/2018     11/20/2018

## 2019-09-10 NOTE — ANESTHESIA POSTPROCEDURE EVALUATION
Patient: Nick Rivas    Procedure Summary     Date:  09/10/19 Room / Location:  [address] [place] [address] MAIN OR 12 / [address] [place] [address] MAIN OR    Anesthesia Start:  1138 Anesthesia Stop:  5612    Procedure:  KNEE TOTAL REPLACEMENT (Left Knee) Diagnosis:  (osteoarthritis left knee)

## 2019-09-10 NOTE — OPERATIVE REPORT
[facility]    PATIENT'S NAME: Margarita Batres   ATTENDING PHYSICIAN: Bia Rivera MD   OPERATING PHYSICIAN: Misa Garcia MD   PATIENT ACCOUNT#:   [id number]    LOCATION:  [place] [address]  MEDICAL RECORD #:   [id number] made first, and a patella protector was placed over the cut surface of the patella which was then subluxed into the lateral gutter.   The anterior and posterior cruciate ligaments were excised, and the femoral template was placed over the distal femur and s preparations were made for the stemmed portion of the tibial component. The trochlear recess was cut for the femur. The trial components were then removed, and the bone was prepared with pulsatile lavage.   All three components were cemented in place colette

## 2019-09-10 NOTE — INTERVAL H&P NOTE
Pre-op Diagnosis: osteoarthritis left knee    The above referenced H&P was reviewed by Jennifer Aguirre MD on 9/10/2019, the patient was examined and no significant changes have occurred in the patient's condition since the H&P was performed.   I disc

## 2019-09-10 NOTE — ANESTHESIA PREPROCEDURE EVALUATION
Anesthesia PreOp Note    HPI:     Susan Amezquita is a 81year old female who presents for preoperative consultation requested by: Moncho Olivares MD    Date of Surgery: 9/10/2019    Procedure(s):  KNEE TOTAL REPLACEMENT  Indication: osteoarthrit REPLACEMENT Right 11/6/2018    Performed by Carol Ann Negro MD at [address] [place] [address] MAIN OR   • TOTAL KNEE REPLACEMENT Right 11/06/2018    DR. DURAN         Medications Prior to Admission:  Senna 8.6 MG Oral Tab Take 8.6 mg by mouth nightly.  Disp:  Rfl:     Me Socioeconomic History      Marital status:        Spouse name: Not on file      Number of children: Not on file      Years of education: Not on file      Highest education level: Not on file    Occupational History      Not on file    Social Needs Asked        Exercise: Not Asked        Bike Helmet: Not Asked        Seat Belt: Not Asked        Self-Exams: Not Asked    Social History Narrative      Not on file      Available pre-op labs reviewed.         Lab Results   Component Value Date    INR 1.00

## 2019-09-10 NOTE — PLAN OF CARE
Received patient from PACU, awake and alert. Muhammad in place. CMS intact to LLE, bulky dressing in place. States pain is minimal, will continue to monitor.  Patient did have x1 emesis, stated feeling better after - denied zofran at the moment, will offer aga interventions unsuccessful or patient reports new pain  - Anticipate increased pain with activity and pre-medicate as appropriate  Outcome: Progressing     Problem: RISK FOR INFECTION - ADULT  Goal: Absence of fever/infection during anticipated neutropenic

## 2019-09-11 LAB
DEPRECATED RDW RBC AUTO: 47.5 FL (ref 35.1–46.3)
ERYTHROCYTE [DISTWIDTH] IN BLOOD BY AUTOMATED COUNT: 13.4 % (ref 11–15)
HCT VFR BLD AUTO: 33.3 % (ref 35–48)
HGB BLD-MCNC: 11.2 G/DL (ref 12–16)
MCH RBC QN AUTO: 32.1 PG (ref 26–34)
MCHC RBC AUTO-ENTMCNC: 33.6 G/DL (ref 31–37)
MCV RBC AUTO: 95.4 FL (ref 80–100)
PLATELET # BLD AUTO: 145 10(3)UL (ref 150–450)
RBC # BLD AUTO: 3.49 X10(6)UL (ref 3.8–5.3)
WBC # BLD AUTO: 4.5 X10(3) UL (ref 4–11)

## 2019-09-11 PROCEDURE — 99232 SBSQ HOSP IP/OBS MODERATE 35: CPT | Performed by: HOSPITALIST

## 2019-09-11 NOTE — ANESTHESIA POST-OP FOLLOW-UP NOTE
[facility] is POD#1 after   Surgical Procedures     Case IDs Date Procedure Surgeon Location Status    [id number] 9/10/19 KNEE TOTAL REPLACEMENT Cielo Whyte MD [facility] OR Anna Mcclain underwent spinal anesthesia for [employer]

## 2019-09-11 NOTE — OCCUPATIONAL THERAPY NOTE
OCCUPATIONAL THERAPY EVALUATION - INPATIENT      Room Number: 189/836-R  Evaluation Date: 9/11/2019  Type of Evaluation: Initial  Presenting Problem: L TKA    Physician Order: IP Consult to Occupational Therapy  Reason for Therapy: ADL/IADL Dysfunction and training;Patient/Family education;Patient/Family training;Equipment eval/education(goals  9-21)       OCCUPATIONAL THERAPY MEDICAL/SOCIAL HISTORY     Problem List   Principal Problem:    Osteoarthritis of left knee  Active Problems:    Dyslipidemia fall risk    WEIGHT BEARING RESTRICTION  Weight Bearing Restriction: L lower extremity           L Lower Extremity: Weight Bearing as Tolerated    PAIN ASSESSMENT  Ratin          ACTIVITY TOLERANCE                         O2 SATURATIONS met;Call light within reach;RN aware of session/findings; All patient questions and concerns addressed; Family present    OT Goals  Patient self-stated goal is: return home after JACOB     Patient will complete LE dressing with mod I  Comment:     Patient will

## 2019-09-11 NOTE — PLAN OF CARE
Pt is POD #0-1. Vital signs within normal limits. Denies pain. Alert and oriented x4. CMS intact. On room air. Tolerating clear liquids. Nausea improved. Muhammad in place, to be removed this morning. Aspirin and SCDs for DVT prophylaxis.  Up with one assist a ordered  - Implement neutropenic guidelines  Outcome: Progressing     Problem: SAFETY ADULT - FALL  Goal: Free from fall injury  Description  INTERVENTIONS:  - Assess pt frequently for physical needs  - Identify cognitive and physical deficits and behavior

## 2019-09-11 NOTE — PHYSICAL THERAPY NOTE
PHYSICAL THERAPY KNEE TREATMENT NOTE - INPATIENT     Room Number: 332/886-Q             Presenting Problem: L TKA    Problem List  Principal Problem:    Osteoarthritis of left knee  Active Problems:    Dyslipidemia    Parkinson disease ([address]    Pre-op test training;Stair training    SUBJECTIVE  Pt was agreeable to therapy session.      OBJECTIVE  Precautions: None    WEIGHT BEARING STATUS  Weight Bearing Restriction: L lower extremity           L Lower Extremity: Weight Bearing as Tolerated    PAIN ASSESSMENT Patient End of Session: Up in chair;Needs met;Call light within reach;RN aware of session/findings; All patient questions and concerns addressed    CURRENT GOALS  Goals to be met by: 9/13/19  Patient Goal Patient's self-stated goal is: home   Goal #1 Pa

## 2019-09-11 NOTE — CM/SW NOTE
SW received MDO to discuss discharge planning with pt. SW met with pt in pt's room. Pt states [place] with 5-6 steps. Pt lives alone but has a son who helps. Pt states she was indep prior to hospitalization.  Pt has had hx with [facility].

## 2019-09-11 NOTE — PROGRESS NOTES
[facility] Patient Status:  Inpatient    1935 MRN [id number]   Location [facility] 4W/SW/SE Attending Mohinder Felix MD   Hosp Day # 1 PCP Maggie Cabrera MD, MD     Subjective:  Post-Operative Day: 1 Status Pos

## 2019-09-11 NOTE — PROGRESS NOTES
[facility]    Progress Note    Patt Cueva Patient Status:  Inpatient    1935 MRN [id number]   Location [facility] 4W/SW/SE Attending Eddi Vasquez MD   Hosp Day # 1 PCP Uli Clay MD, MD        Subjective: ([facility]  CONT HOME MEDS.              Results:     Lab Results   Component Value Date    WBC 4.5 09/11/2019    HGB 11.2 (L) 09/11/2019    HCT 33.3 (L) 09/11/2019    .0 (L) 09/11/2019    CREATSERUM 0.70 11/20/2018    BUN 20 11/20/2018     11/20/20

## 2019-09-11 NOTE — PLAN OF CARE
Patient is alert and oriented X3. Check void. Up with SB with Rw. CMS intact to LLE. ACE wrap in place. Polar ice to knee. Patient denies pain at the moment, will continue to monitor. ASA for DVT prophylaxis. SCD's to RLE. CPM tx.  Call light is within reac increased pain with activity and pre-medicate as appropriate  Outcome: Progressing     Problem: RISK FOR INFECTION - ADULT  Goal: Absence of fever/infection during anticipated neutropenic period  Description  INTERVENTIONS  - Monitor WBC  - Administer grow

## 2019-09-12 LAB
DEPRECATED RDW RBC AUTO: 48.4 FL (ref 35.1–46.3)
ERYTHROCYTE [DISTWIDTH] IN BLOOD BY AUTOMATED COUNT: 13.6 % (ref 11–15)
HCT VFR BLD AUTO: 31.1 % (ref 35–48)
HGB BLD-MCNC: 10.3 G/DL (ref 12–16)
MCH RBC QN AUTO: 31.7 PG (ref 26–34)
MCHC RBC AUTO-ENTMCNC: 33.1 G/DL (ref 31–37)
MCV RBC AUTO: 95.7 FL (ref 80–100)
PLATELET # BLD AUTO: 148 10(3)UL (ref 150–450)
RBC # BLD AUTO: 3.25 X10(6)UL (ref 3.8–5.3)
WBC # BLD AUTO: 9.6 X10(3) UL (ref 4–11)

## 2019-09-12 PROCEDURE — 99232 SBSQ HOSP IP/OBS MODERATE 35: CPT | Performed by: HOSPITALIST

## 2019-09-12 RX ORDER — CALCIUM CARBONATE 200(500)MG
500 TABLET,CHEWABLE ORAL EVERY 4 HOURS PRN
Status: DISCONTINUED | OUTPATIENT
Start: 2019-09-12 | End: 2019-09-13

## 2019-09-12 RX ORDER — TRAMADOL HYDROCHLORIDE 50 MG/1
50 TABLET ORAL EVERY 6 HOURS PRN
Status: DISCONTINUED | OUTPATIENT
Start: 2019-09-12 | End: 2019-09-13

## 2019-09-12 RX ORDER — GABAPENTIN 300 MG/1
300 CAPSULE ORAL NIGHTLY
Qty: 20 CAPSULE | Refills: 0 | Status: SHIPPED | OUTPATIENT
Start: 2019-09-12 | End: 2019-12-30

## 2019-09-12 RX ORDER — CALCIUM CARBONATE 200(500)MG
500 TABLET,CHEWABLE ORAL
Status: DISCONTINUED | OUTPATIENT
Start: 2019-09-12 | End: 2019-09-12

## 2019-09-12 RX ORDER — MELATONIN
325
Qty: 20 TABLET | Refills: 0 | Status: SHIPPED | OUTPATIENT
Start: 2019-09-12 | End: 2019-12-30

## 2019-09-12 RX ORDER — ASPIRIN 325 MG
325 TABLET ORAL 2 TIMES DAILY
Qty: 60 TABLET | Refills: 0 | Status: SHIPPED | OUTPATIENT
Start: 2019-09-12 | End: 2019-09-13

## 2019-09-12 RX ORDER — PSEUDOEPHEDRINE HCL 30 MG
100 TABLET ORAL 2 TIMES DAILY
Qty: 20 CAPSULE | Refills: 0 | Status: SHIPPED | OUTPATIENT
Start: 2019-09-12

## 2019-09-12 RX ORDER — HYDROCODONE BITARTRATE AND ACETAMINOPHEN 5; 325 MG/1; MG/1
1 TABLET ORAL EVERY 4 HOURS PRN
Qty: 50 TABLET | Refills: 0 | Status: SHIPPED | OUTPATIENT
Start: 2019-09-12 | End: 2019-12-30

## 2019-09-12 NOTE — OCCUPATIONAL THERAPY NOTE
OCCUPATIONAL THERAPY TREATMENT NOTE - INPATIENT    Room Number: 100/653-D         Presenting Problem: L TKA     Problem List  Principal Problem:    Osteoarthritis of left knee  Active Problems:    Dyslipidemia    Parkinson disease ([address]    Pre-op testing extremity           L Lower Extremity: Weight Bearing as Tolerated    PAIN ASSESSMENT  Rating: Unable to rate  Location: (L knee)  Management Techniques:  Activity promotion     ACTIVITY TOLERANCE                         O2 SATURATIONS                ACTIVI Comment: CGA with cues for safety with L leg placement, and with use of grab bar for support. Patient will complete self care task at sink level with mod I standing ~6 min   Comment:pt tolerated standing for 3 min at sink for simple hand wash.  C/o pain

## 2019-09-12 NOTE — PLAN OF CARE
Problem: Patient Centered Care  Goal: Patient preferences are identified and integrated in the patient's plan of care  Description  Interventions:  - What would you like us to know as we care for you?  .   - Provide timely, complete, and accurate informat neutropenic guidelines  Outcome: Progressing     Problem: SAFETY ADULT - FALL  Goal: Free from fall injury  Description  INTERVENTIONS:  - Assess pt frequently for physical needs  - Identify cognitive and physical deficits and behaviors that affect risk of

## 2019-09-12 NOTE — CM/SW NOTE
3:35pm    SW received a call from Landon/ liachamp at [facility] who stated they can accept pt tomorrow at 10:30AM if pt is medically cleared to discharge tomorrow. MICHAELA informed pt, and pt's family.  Pt's dtr Tamela Jung stated she will now be the one transporting pt

## 2019-09-12 NOTE — PLAN OF CARE
Pt is POD #1-2. Vital signs within normal limits. PRN Norco provided for pain. Alert and oriented x4. CMS intact. On room air. Tolerating general diet. Voids freely although incontinent. Dressing clean, dry, and intact. Up with one assist and the walker.  A growth factors as ordered  - Implement neutropenic guidelines  Outcome: Progressing     Problem: SAFETY ADULT - FALL  Goal: Free from fall injury  Description  INTERVENTIONS:  - Assess pt frequently for physical needs  - Identify cognitive and physical def

## 2019-09-12 NOTE — PROGRESS NOTES
[facility]    Progress Note    Ama Obrien Patient Status:  Inpatient    1935 MRN [id number]   Location [facility] 4W/SW/SE Attending Rebecca Ag MD   Hosp Day # 2 PCP Cody Drake MD, MD       Subjective:   B

## 2019-09-12 NOTE — PHYSICAL THERAPY NOTE
PHYSICAL THERAPY KNEE TREATMENT NOTE - INPATIENT     Room Number: 650/790-A             Presenting Problem: L TKA    Problem List  Principal Problem:    Osteoarthritis of left knee  Active Problems:    Dyslipidemia    Parkinson disease ([address]    Pre-op test tested    ACTIVITY TOLERANCE                         O2 WALK                  AM-PAC '6-Clicks' INPATIENT SHORT FORM - BASIC MOBILITY  How much difficulty does the patient currently have. ..  -   Turning over in bed (including adjusting bedclothes, sheets a am  Mod A pm   Goal #3 Patient is able to ambulate 300 feet with assistive device at assistance level: modified independent    Goal #3   Current Status 61' with the RW min A am  28' with the RW min A pm   Goal #4 Patient will negotiate 5 stairs/one curb w/

## 2019-09-12 NOTE — PROGRESS NOTES
[facility]    Progress Note    Jeny Vanessa Patient Status:  Inpatient    1935 MRN [id number]   Location [facility] 4W/SW/SE Attending Sandford Duane, MD   Hosp Day # 2 PCP Galindo Parker MD, MD        Subjective: Parkinson disease ([facility]  CONT HOME MEDS.              Results:     Lab Results   Component Value Date    WBC 9.6 09/12/2019    HGB 10.3 (L) 09/12/2019    HCT 31.1 (L) 09/12/2019    .0 (L) 09/12/2019    CREATSERUM 0.70 11/20/2018    BUN 20 11/20/2

## 2019-09-13 ENCOUNTER — SNF ADMIT/H&P (OUTPATIENT)
Dept: INTERNAL MEDICINE CLINIC | Facility: SKILLED NURSING FACILITY | Age: 84
End: 2019-09-13

## 2019-09-13 VITALS
OXYGEN SATURATION: 98 % | TEMPERATURE: 98 F | WEIGHT: 128.63 LBS | HEART RATE: 74 BPM | HEIGHT: 64.5 IN | BODY MASS INDEX: 21.69 KG/M2 | SYSTOLIC BLOOD PRESSURE: 137 MMHG | DIASTOLIC BLOOD PRESSURE: 63 MMHG | RESPIRATION RATE: 16 BRPM

## 2019-09-13 DIAGNOSIS — R60.0 EDEMA OF LOWER EXTREMITY: ICD-10-CM

## 2019-09-13 DIAGNOSIS — Z96.652 STATUS POST TOTAL LEFT KNEE REPLACEMENT: ICD-10-CM

## 2019-09-13 DIAGNOSIS — M17.12 PRIMARY OSTEOARTHRITIS OF LEFT KNEE: ICD-10-CM

## 2019-09-13 DIAGNOSIS — R53.1 WEAKNESS: ICD-10-CM

## 2019-09-13 DIAGNOSIS — E78.5 DYSLIPIDEMIA: ICD-10-CM

## 2019-09-13 LAB
DEPRECATED RDW RBC AUTO: 48 FL (ref 35.1–46.3)
ERYTHROCYTE [DISTWIDTH] IN BLOOD BY AUTOMATED COUNT: 13.6 % (ref 11–15)
HCT VFR BLD AUTO: 32.4 % (ref 35–48)
HGB BLD-MCNC: 10.7 G/DL (ref 12–16)
MCH RBC QN AUTO: 31.5 PG (ref 26–34)
MCHC RBC AUTO-ENTMCNC: 33 G/DL (ref 31–37)
MCV RBC AUTO: 95.3 FL (ref 80–100)
PLATELET # BLD AUTO: 138 10(3)UL (ref 150–450)
RBC # BLD AUTO: 3.4 X10(6)UL (ref 3.8–5.3)
WBC # BLD AUTO: 8.8 X10(3) UL (ref 4–11)

## 2019-09-13 PROCEDURE — 99239 HOSP IP/OBS DSCHRG MGMT >30: CPT | Performed by: HOSPITALIST

## 2019-09-13 PROCEDURE — 1123F ACP DISCUSS/DSCN MKR DOCD: CPT | Performed by: NURSE PRACTITIONER

## 2019-09-13 PROCEDURE — 99309 SBSQ NF CARE MODERATE MDM 30: CPT | Performed by: NURSE PRACTITIONER

## 2019-09-13 RX ORDER — PANTOPRAZOLE SODIUM 40 MG/1
40 TABLET, DELAYED RELEASE ORAL
Refills: 0 | Status: SHIPPED | COMMUNITY
Start: 2019-09-14

## 2019-09-13 RX ORDER — TRAMADOL HYDROCHLORIDE 50 MG/1
50 TABLET ORAL EVERY 6 HOURS PRN
Qty: 10 TABLET | Refills: 0 | Status: SHIPPED | OUTPATIENT
Start: 2019-09-13 | End: 2019-12-30

## 2019-09-13 RX ORDER — CALCIUM CARBONATE 200(500)MG
500 TABLET,CHEWABLE ORAL EVERY 4 HOURS PRN
Refills: 0 | Status: SHIPPED | COMMUNITY
Start: 2019-09-13

## 2019-09-13 NOTE — PLAN OF CARE
Problem: Patient Centered Care  Goal: Patient preferences are identified and integrated in the patient's plan of care  Description  Interventions:  - What would you like us to know as we care for you?   - Provide timely, complete, and accurate informatio physical limitations  - Instruct pt to call for assistance with activity based on assessment  - Modify environment to reduce risk of injury  - Provide assistive devices as appropriate  - Consider OT/PT consult to assist with strengthening/mobility  - Encou

## 2019-09-13 NOTE — PROGRESS NOTES
[facility]    Progress Note    Azeem Milner Patient Status:  Inpatient    1935 MRN [id number]   Location [facility] 4W/SW/SE Attending Mohinder Felix MD   Hosp Day # 3 PCP Maggie Cabrera MD, MD       Subjective:   B

## 2019-09-13 NOTE — CM/SW NOTE
MICHAELA spoke with WANDA/ [facility] who stated pt is medically ready for discharge this morning to [facility]. However pt is waiting for a script to be written prior to official dc.     Landon/ Liaison at [facility] informed MICHAELA that they can accept the pt tomorrow at 10:30AM.     PP i

## 2019-09-13 NOTE — PROGRESS NOTES
HPI: Jen Zuniga  Is an 82 yo female with PMH significant for OA of L knee, OA of right knee s/p R TKA, Parkinson's disease, dyslipidemia who was admitted to [facility] 9/10/19  for a LTKA by Dr Johanna Barrios after failed outpatient management with cortisone inje 51.0      Smokeless tobacco: Never Used    Alcohol use:  Yes      Alcohol/week: 0.8 standard drinks      Types: 1 Standard drinks or equivalent per week    Drug use: No      ALLERGIES:    Biaxin [Clarithromy*    RASH  Celebrex [Celecoxib]    OTHER (SEE COMM Dyslipidemia  Cont rosuvastatin    Parkinson's Disease  Cont arabella Dave, HOLLY    09/13/19   12:41 PM

## 2019-09-13 NOTE — DISCHARGE SUMMARY
[facility]    Discharge Summary    Rosalinda Mcclain Patient Status:  Inpatient    1935 MRN [id number]   Location [facility] 4W/SW/SE Attending Marybeth Post MD   Hosp Day # 3 PCP Karoline Jackson MD, MD     Date of Adm replacement.       Hospital Course:   Osteoarthritis of left knee  S/P L KNEE ARTHROPLASTY  PAIN CONTROL  NEURO VASCULAR CHECKS  ASA DVT PROPHYLAXIS  PT/OT     Dyslipidemia  CONT HOME MED,.      Parkinson disease ([facility]  CONT HOME MEDS.      Consultations: review them with you.             STOP taking these medications    acetaminophen 500 MG Tabs  Commonly known as:  TYLENOL EXTRA STRENGTH     raNITIdine HCl 150 MG Caps  Commonly known as:  ZANTAC           Where to Get Your Medications      You can get thes

## 2019-09-16 ENCOUNTER — SNF VISIT (OUTPATIENT)
Dept: INTERNAL MEDICINE CLINIC | Facility: SKILLED NURSING FACILITY | Age: 84
End: 2019-09-16

## 2019-09-16 DIAGNOSIS — R53.1 WEAKNESS: ICD-10-CM

## 2019-09-16 DIAGNOSIS — R60.0 EDEMA OF LOWER EXTREMITY: ICD-10-CM

## 2019-09-16 DIAGNOSIS — Z96.652 STATUS POST TOTAL LEFT KNEE REPLACEMENT: ICD-10-CM

## 2019-09-16 PROCEDURE — 99308 SBSQ NF CARE LOW MDM 20: CPT | Performed by: NURSE PRACTITIONER

## 2019-09-16 NOTE — PROGRESS NOTES
HPI: Gerson Nicholas  Is an 80 yo female with PMH significant for OA of L knee, OA of right knee s/p R TKA, Parkinson's disease, dyslipidemia who was admitted to [facility] 9/10/19  for a LTKA by Dr Rosette Johnson after failed outpatient management with cortisone inje 51.0      Smokeless tobacco: Never Used    Alcohol use:  Yes      Alcohol/week: 0.8 standard drinks      Types: 1 Standard drinks or equivalent per week    Drug use: No      ALLERGIES:    Biaxin [Clarithromy*    RASH  Celebrex [Celecoxib]    OTHER (SEE COMM 50mg po q6 prn, norco 5/325mg q4 prn for pain control  Bowel regimen  F/u Dr Emmanuelle Whimtan- per pt she has appt on 10/3    Anemia  hgb 9.4 9/16- cont iron supplementation daily     Dyslipidemia  Cont rosuvastatin    Parkinson's Disease  Cont sinemet tid with prn

## 2019-09-20 ENCOUNTER — SNF DISCHARGE (OUTPATIENT)
Dept: INTERNAL MEDICINE CLINIC | Facility: SKILLED NURSING FACILITY | Age: 84
End: 2019-09-20

## 2019-09-20 DIAGNOSIS — Z96.652 STATUS POST TOTAL LEFT KNEE REPLACEMENT: ICD-10-CM

## 2019-09-20 DIAGNOSIS — D64.9 ANEMIA, UNSPECIFIED TYPE: ICD-10-CM

## 2019-09-20 DIAGNOSIS — G20 PARKINSON DISEASE (HCC): Chronic | ICD-10-CM

## 2019-09-20 PROCEDURE — 99308 SBSQ NF CARE LOW MDM 20: CPT | Performed by: NURSE PRACTITIONER

## 2019-09-20 NOTE — PROGRESS NOTES
HPI: Magdiel Judge  Is an 80 yo female with PMH significant for OA of L knee, OA of right knee s/p R TKA, Parkinson's disease, dyslipidemia who was admitted to [address] [place] [address] 9/10/19  for a LTKA by Dr Shekhar Soler after failed outpatient management with cortisone inje quittin.0      Smokeless tobacco: Never Used    Alcohol use:  Yes      Alcohol/week: 0.8 standard drinks      Types: 1 Standard drinks or equivalent per week    Drug use: No       ALLERGIES:    Biaxin [Clarithromy*    RASH  Celebrex [Celecoxib]    OTHE needed  F/u neurology as needed

## 2019-12-30 ENCOUNTER — HOSPITAL ENCOUNTER (OUTPATIENT)
Age: 84
Discharge: HOME | End: 2019-12-30
Attending: EMERGENCY MEDICINE
Payer: MEDICARE

## 2019-12-30 ENCOUNTER — APPOINTMENT (OUTPATIENT)
Dept: GENERAL RADIOLOGY | Age: 84
End: 2019-12-30
Attending: EMERGENCY MEDICINE
Payer: MEDICARE

## 2019-12-30 VITALS
SYSTOLIC BLOOD PRESSURE: 125 MMHG | HEART RATE: 81 BPM | TEMPERATURE: 98 F | BODY MASS INDEX: 20.83 KG/M2 | OXYGEN SATURATION: 95 % | HEIGHT: 65 IN | WEIGHT: 125 LBS | DIASTOLIC BLOOD PRESSURE: 74 MMHG | RESPIRATION RATE: 18 BRPM

## 2019-12-30 DIAGNOSIS — M19.071 OSTEOARTHRITIS OF RIGHT FOOT, UNSPECIFIED OSTEOARTHRITIS TYPE: Primary | ICD-10-CM

## 2019-12-30 PROCEDURE — 73630 X-RAY EXAM OF FOOT: CPT | Performed by: EMERGENCY MEDICINE

## 2019-12-30 PROCEDURE — 99213 OFFICE O/P EST LOW 20 MIN: CPT

## 2019-12-30 NOTE — ED PROVIDER NOTES
Patient Seen in: [facility] Immediate Care In [address] [place] [address]    History   Patient presents with:   Toe Pain    Stated Complaint: rt toe pain    HPI    HPI: Nathaniel Sanchez is a 81year old female who presents after an injury to the right great toe  jay mouth daily. Coenzyme Q10 (COQ10 OR),  Take 20 mg by mouth daily. carbidopa-levodopa  MG Oral Tab,  Take 1.5 tablets by mouth 3 (three) times daily. Misc Natural Products (OSTEO BI-FLEX ADV DOUBLE ST OR),  Take 2 tablets by mouth daily. 125/74   Pulse 81   Temp 98.1 °F (36.7 °C) (Temporal)   Resp 18   Ht 165.1 cm (5' 5\")   Wt 56.7 kg   SpO2 95%   BMI 20.80 kg/m²         Physical Exam      MENTAL STATUS: Alert, oriented, and cooperative.  No focal deficit  HEAD: Atraumatic  NECK: Supple, f

## 2020-02-20 ENCOUNTER — HOSPITAL ENCOUNTER (OUTPATIENT)
Dept: MAMMOGRAPHY | Age: 85
Discharge: HOME | End: 2020-02-20
Attending: INTERNAL MEDICINE
Payer: MEDICARE

## 2020-02-20 DIAGNOSIS — Z12.31 ENCOUNTER FOR SCREENING MAMMOGRAM FOR MALIGNANT NEOPLASM OF BREAST: ICD-10-CM

## 2020-02-20 PROCEDURE — 77067 SCR MAMMO BI INCL CAD: CPT | Performed by: INTERNAL MEDICINE

## 2020-02-20 PROCEDURE — 77063 BREAST TOMOSYNTHESIS BI: CPT | Performed by: INTERNAL MEDICINE

## 2020-05-09 ENCOUNTER — HOSPITAL ENCOUNTER (OUTPATIENT)
Age: 85
Discharge: HOME | End: 2020-05-09
Attending: EMERGENCY MEDICINE
Payer: MEDICARE

## 2020-05-09 VITALS
RESPIRATION RATE: 18 BRPM | OXYGEN SATURATION: 100 % | WEIGHT: 126 LBS | BODY MASS INDEX: 21 KG/M2 | DIASTOLIC BLOOD PRESSURE: 59 MMHG | TEMPERATURE: 98 F | SYSTOLIC BLOOD PRESSURE: 128 MMHG | HEART RATE: 85 BPM

## 2020-05-09 DIAGNOSIS — N30.01 ACUTE CYSTITIS WITH HEMATURIA: Primary | ICD-10-CM

## 2020-05-09 PROCEDURE — 81002 URINALYSIS NONAUTO W/O SCOPE: CPT

## 2020-05-09 PROCEDURE — 87086 URINE CULTURE/COLONY COUNT: CPT | Performed by: EMERGENCY MEDICINE

## 2020-05-09 PROCEDURE — 99214 OFFICE O/P EST MOD 30 MIN: CPT

## 2020-05-09 PROCEDURE — 87186 SC STD MICRODIL/AGAR DIL: CPT | Performed by: EMERGENCY MEDICINE

## 2020-05-09 PROCEDURE — 87077 CULTURE AEROBIC IDENTIFY: CPT | Performed by: EMERGENCY MEDICINE

## 2020-05-09 RX ORDER — CEPHALEXIN 500 MG/1
500 CAPSULE ORAL 2 TIMES DAILY
Qty: 10 CAPSULE | Refills: 0 | Status: SHIPPED | OUTPATIENT
Start: 2020-05-09 | End: 2020-05-14

## 2020-05-09 NOTE — ED PROVIDER NOTES
Patient Seen in: [facility] Immediate Care In [address] [place] [address]    History   Patient presents with:  Urinary Symptoms    Stated Complaint: UTI    HPI    Patient complains of urinary frequency, urgency and dysuria that began 1 day ago.   Patient denies fev (two) times daily. melatonin 3 MG Oral Tab,  Take 3 mg by mouth nightly. Senna 8.6 MG Oral Tab,  Take 8.6 mg by mouth nightly. Methylcellulose, Laxative, (CITRUCEL) Oral Powder,  Take by mouth daily.    carbidopa-levodopa  MG Oral Tablet Dispers air)       Current:/59   Pulse 85   Temp 97.8 °F (36.6 °C) (Temporal)   Resp 18   Wt 57.2 kg   SpO2 100%   BMI 20.97 kg/m²   GENERAL: alert, no distress  SKIN: good skin turgor, no rashes  HEENT: atraumatic, normocephalic, ears and throat are clear 0

## 2020-06-17 ENCOUNTER — OFFICE VISIT (OUTPATIENT)
Dept: DERMATOLOGY CLINIC | Facility: CLINIC | Age: 85
End: 2020-06-17
Payer: COMMERCIAL

## 2020-06-17 DIAGNOSIS — D23.4 BENIGN NEOPLASM OF SCALP AND SKIN OF NECK: ICD-10-CM

## 2020-06-17 DIAGNOSIS — D23.70 BENIGN NEOPLASM OF SKIN OF LOWER LIMB, INCLUDING HIP, UNSPECIFIED LATERALITY: ICD-10-CM

## 2020-06-17 DIAGNOSIS — L82.1 SEBORRHEIC KERATOSES: ICD-10-CM

## 2020-06-17 DIAGNOSIS — D23.60 BENIGN NEOPLASM OF SKIN OF UPPER LIMB, INCLUDING SHOULDER, UNSPECIFIED LATERALITY: ICD-10-CM

## 2020-06-17 DIAGNOSIS — D23.30 BENIGN NEOPLASM OF SKIN OF FACE: ICD-10-CM

## 2020-06-17 DIAGNOSIS — D23.5 BENIGN NEOPLASM OF SKIN OF TRUNK, EXCEPT SCROTUM: ICD-10-CM

## 2020-06-17 DIAGNOSIS — L82.0 INFLAMED SEBORRHEIC KERATOSIS: Primary | ICD-10-CM

## 2020-06-17 PROCEDURE — G0463 HOSPITAL OUTPT CLINIC VISIT: HCPCS | Performed by: DERMATOLOGY

## 2020-06-17 PROCEDURE — 99202 OFFICE O/P NEW SF 15 MIN: CPT | Performed by: DERMATOLOGY

## 2020-06-21 NOTE — PROGRESS NOTES
Rina Castro is a 81year old female. HPI:     CC:  Patient presents with:  Full Skin Exam: new Patient present for full body skin exam . Patient denies any hx of sc        Allergies:  Biaxin [Clarithromycin]; Celebrex [Celecoxib];  Neurontin [Gabapen Years since quittin.8      Smokeless tobacco: Never Used    Alcohol use:  Yes      Alcohol/week: 0.8 standard drinks      Types: 1 Standard drinks or equivalent per week    Drug use: No       Current Outpatient Medications   Medication Sig Dispense Esophageal reflux    • H/O cystoscopy 11/05/2013    per [employer]: cystoscopy with bilateral retrograde pyelography 2013   • High cholesterol    • History of pregnancy     per [employer]: 4 vaginal deliveries   • Incontinence    • Mitral valve prolapse     per session: Not on file      Stress: Not on file    Relationships      Social connections:        Talks on phone: Not on file        Gets together: Not on file        Attends Anabaptism service: Not on file        Active member of club or organization: Not on Multiple underlying health conditions as noted including Parkinson's. Concern regarding multiple skin lesions and elevated risk of skin cancer in Parkinson's disease .     Concern with multiple lesions over the back and arms     patient presents with rina limb, including shoulder, unspecified laterality    See details on map. Remarkable for:    Multi-waxy tan keratotic papules crusted more so at bra line right lateral upper back mid back.   Left wrist.  Along the central chest, mid abdomen more stucco l

## 2021-02-17 ENCOUNTER — HOSPITAL ENCOUNTER (OUTPATIENT)
Age: 86
Discharge: HOME | End: 2021-02-17
Payer: MEDICARE

## 2021-02-17 VITALS
SYSTOLIC BLOOD PRESSURE: 151 MMHG | TEMPERATURE: 99 F | OXYGEN SATURATION: 98 % | HEART RATE: 84 BPM | RESPIRATION RATE: 16 BRPM | DIASTOLIC BLOOD PRESSURE: 76 MMHG

## 2021-02-17 DIAGNOSIS — J02.0 STREPTOCOCCAL SORE THROAT: Primary | ICD-10-CM

## 2021-02-17 LAB — S PYO AG THROAT QL: POSITIVE

## 2021-02-17 PROCEDURE — 87880 STREP A ASSAY W/OPTIC: CPT | Performed by: NURSE PRACTITIONER

## 2021-02-17 PROCEDURE — 99213 OFFICE O/P EST LOW 20 MIN: CPT | Performed by: NURSE PRACTITIONER

## 2021-02-17 RX ORDER — CEPHALEXIN 500 MG/1
500 CAPSULE ORAL 3 TIMES DAILY
Qty: 30 CAPSULE | Refills: 0 | Status: SHIPPED | OUTPATIENT
Start: 2021-02-17 | End: 2021-02-27

## 2021-02-17 NOTE — ED PROVIDER NOTES
Patient presents with:  Sore Throat      HPI:     Aparna Bee is a 81year old female who presents for evaluation of a chief complaint of sore throat that started yesterday evening.   The patient states it feels as though her throat is dry and scratch Alcohol use:  Yes        Alcohol/week: 0.8 standard drinks        Types: 1 Standard drinks or equivalent per week      Drug use: No      Sexual activity: Not on file    Lifestyle      Physical activity        Days per week: Not on file        Minutes per se no thyromegaly. No neck stiffness.    CARDIO: RRR without murmur  EXTREMITIES: no cyanosis, clubbing or edema  GI: soft, non-tender, normal bowel sounds  HEAD: normocephalic, atraumatic  EYES: sclera non icteric bilateral, conjunctiva clear  EARS: TM  bilat

## 2021-02-17 NOTE — ED INITIAL ASSESSMENT (HPI)
C/o scratchy throat since yesterday evening. Received the covid vaccine yesterday morning. Denies any other symptoms.

## 2021-03-08 ENCOUNTER — HOSPITAL ENCOUNTER (OUTPATIENT)
Dept: BONE DENSITY | Facility: HOSPITAL | Age: 86
Discharge: HOME | End: 2021-03-08
Attending: INTERNAL MEDICINE
Payer: MEDICARE

## 2021-03-08 DIAGNOSIS — Z13.820 ENCOUNTER FOR SCREENING FOR OSTEOPOROSIS: ICD-10-CM

## 2021-03-08 PROCEDURE — 77080 DXA BONE DENSITY AXIAL: CPT | Performed by: INTERNAL MEDICINE

## 2021-10-23 ENCOUNTER — OFFICE VISIT (OUTPATIENT)
Dept: DERMATOLOGY CLINIC | Facility: CLINIC | Age: 86
End: 2021-10-23
Payer: COMMERCIAL

## 2021-10-23 DIAGNOSIS — D48.5 NEOPLASM OF UNCERTAIN BEHAVIOR OF SKIN: Primary | ICD-10-CM

## 2021-10-23 DIAGNOSIS — D23.9 BENIGN NEOPLASM OF SKIN, UNSPECIFIED LOCATION: ICD-10-CM

## 2021-10-23 DIAGNOSIS — D22.9 MULTIPLE NEVI: ICD-10-CM

## 2021-10-23 DIAGNOSIS — L82.1 SEBORRHEIC KERATOSES: ICD-10-CM

## 2021-10-23 DIAGNOSIS — Z80.8 FAMILY HISTORY OF SKIN CANCER: ICD-10-CM

## 2021-10-23 DIAGNOSIS — L81.4 LENTIGO: ICD-10-CM

## 2021-10-23 PROCEDURE — 88305 TISSUE EXAM BY PATHOLOGIST: CPT | Performed by: DERMATOLOGY

## 2021-10-23 PROCEDURE — 11102 TANGNTL BX SKIN SINGLE LES: CPT | Performed by: DERMATOLOGY

## 2021-10-23 PROCEDURE — 99213 OFFICE O/P EST LOW 20 MIN: CPT | Performed by: DERMATOLOGY

## 2021-11-01 NOTE — PROGRESS NOTES
The pathology report from last visit showed  as suspected BCC r chin, was treated at visit with E&C-no further treatment at this time. Please log in test results. Please call patient and inform of results and recommendations.  (please add to history).   P

## 2021-11-01 NOTE — PROGRESS NOTES
Results logged in. Patient informed of test results and all KMT's recommendations. Voiced understanding.   Appt made

## 2021-11-07 NOTE — PROGRESS NOTES
Operative Report                     Shave/  Tangential biopsy     Clinical diagnosis:    Size of lesion:    Location:pt with crusted pearly papule  Spec 1 Description >>>>>: right chin  Spec 1 Comment: r/o BCC crusted pearly papule 5mm    Procedure:

## 2021-11-07 NOTE — PROGRESS NOTES
Salina Welch is a 81year old female. HPI:     CC:  Patient presents with:  Lesion: established pt, presents with a new, peeling lesion to chin. Father with BCC. denies discomfort.          Allergies:  Biaxin [Clarithromycin], Celebrex [Celecoxib], Ne Alcohol/week: 0.8 standard drinks      Types: 1 Standard drinks or equivalent per week    Drug use: No       Current Outpatient Medications   Medication Sig Dispense Refill   • ROSUVASTATIN CALCIUM 10 MG Oral Tab TAKE ONE TABLET BY MOUTH ONE TIME DAILY  90 right chin   • Diverticulitis    • Esophageal reflux    • H/O cystoscopy 11/05/2013    per [employer]: cystoscopy with bilateral retrograde pyelography 2013   • High cholesterol    • History of pregnancy     per [employer]: 4 vaginal deliveries   • Incontinence Self-Exams: Not Asked        Grew up on a farm: Not Asked        History of tanning: Not Asked        Outdoor occupation: Not Asked        Breast feeding: Not Asked        Reaction to local anesthetic: No    Social History Narrative      Not on file    Soc with:  Lesion: established pt, presents with a new, peeling lesion to chin. Father with BCC. denies discomfort. Past notes/ records and appropriate/relevant lab results including pathology and past body maps reviewed.  Updated and new information noted plan:    Orders Placed This Encounter      Pathology Tissue P      Meds & Refills for this Visit:  Requested Prescriptions      No prescriptions requested or ordered in this encounter         Neoplasm of uncertain behavior of skin  (primary encounter diagn length. Benign nevi, seborrheic  keratoses, cherry angiomas:  Reassurance regarding other benign skin lesions. Signs and symptoms of skin cancer, ABCDE's of melanoma discussed with patient. Sunscreen use, sun protection, self exams reviewed.   Followup as

## 2022-04-16 ENCOUNTER — HOSPITAL ENCOUNTER (OUTPATIENT)
Age: 87
Discharge: HOME | End: 2022-04-16
Payer: MEDICARE

## 2022-04-16 VITALS
OXYGEN SATURATION: 99 % | DIASTOLIC BLOOD PRESSURE: 72 MMHG | BODY MASS INDEX: 22.13 KG/M2 | HEART RATE: 93 BPM | SYSTOLIC BLOOD PRESSURE: 131 MMHG | HEIGHT: 64.5 IN | RESPIRATION RATE: 18 BRPM | TEMPERATURE: 99 F | WEIGHT: 131.19 LBS

## 2022-04-16 DIAGNOSIS — N30.01 ACUTE CYSTITIS WITH HEMATURIA: ICD-10-CM

## 2022-04-16 DIAGNOSIS — R30.0 DYSURIA: Primary | ICD-10-CM

## 2022-04-16 LAB
BILIRUB UR QL STRIP: NEGATIVE
COLOR UR: YELLOW
GLUCOSE UR STRIP-MCNC: NEGATIVE MG/DL
NITRITE UR QL STRIP: NEGATIVE
PH UR STRIP: 7 [PH]
PROT UR STRIP-MCNC: >=300 MG/DL
SP GR UR STRIP: 1.02
UROBILINOGEN UR STRIP-ACNC: <2 MG/DL

## 2022-04-16 PROCEDURE — 99213 OFFICE O/P EST LOW 20 MIN: CPT

## 2022-04-16 PROCEDURE — 81002 URINALYSIS NONAUTO W/O SCOPE: CPT

## 2022-04-16 RX ORDER — NITROFURANTOIN 25; 75 MG/1; MG/1
100 CAPSULE ORAL 2 TIMES DAILY
Qty: 20 CAPSULE | Refills: 0 | Status: SHIPPED | OUTPATIENT
Start: 2022-04-16 | End: 2022-04-26

## 2022-05-21 ENCOUNTER — OFFICE VISIT (OUTPATIENT)
Dept: DERMATOLOGY CLINIC | Facility: CLINIC | Age: 87
End: 2022-05-21
Payer: COMMERCIAL

## 2022-05-21 DIAGNOSIS — D23.9 BENIGN NEOPLASM OF SKIN, UNSPECIFIED LOCATION: ICD-10-CM

## 2022-05-21 DIAGNOSIS — Z85.828 HISTORY OF NONMELANOMA SKIN CANCER: ICD-10-CM

## 2022-05-21 DIAGNOSIS — L81.4 LENTIGO: ICD-10-CM

## 2022-05-21 DIAGNOSIS — L57.0 AK (ACTINIC KERATOSIS): Primary | ICD-10-CM

## 2022-05-21 DIAGNOSIS — L82.1 SEBORRHEIC KERATOSES: ICD-10-CM

## 2022-08-01 ENCOUNTER — APPOINTMENT (OUTPATIENT)
Dept: CT IMAGING | Facility: HOSPITAL | Age: 87
End: 2022-08-01
Attending: EMERGENCY MEDICINE
Payer: MEDICARE

## 2022-08-01 ENCOUNTER — HOSPITAL ENCOUNTER (EMERGENCY)
Facility: HOSPITAL | Age: 87
Discharge: HOME | End: 2022-08-01
Attending: EMERGENCY MEDICINE
Payer: MEDICARE

## 2022-08-01 VITALS
DIASTOLIC BLOOD PRESSURE: 62 MMHG | SYSTOLIC BLOOD PRESSURE: 142 MMHG | OXYGEN SATURATION: 97 % | HEART RATE: 93 BPM | RESPIRATION RATE: 15 BRPM | TEMPERATURE: 98 F

## 2022-08-01 DIAGNOSIS — S00.03XA HEMATOMA OF SCALP, INITIAL ENCOUNTER: Primary | ICD-10-CM

## 2022-08-01 PROCEDURE — 99284 EMERGENCY DEPT VISIT MOD MDM: CPT

## 2022-08-01 PROCEDURE — 70450 CT HEAD/BRAIN W/O DYE: CPT | Performed by: EMERGENCY MEDICINE

## 2022-08-01 RX ORDER — ACETAMINOPHEN 325 MG/1
TABLET ORAL
Status: COMPLETED
Start: 2022-08-01 | End: 2022-08-01

## 2022-08-01 RX ORDER — ACETAMINOPHEN 325 MG/1
650 TABLET ORAL ONCE
Status: COMPLETED | OUTPATIENT
Start: 2022-08-01 | End: 2022-08-01

## 2022-08-01 RX ORDER — CARBIDOPA AND LEVODOPA 25; 100 MG/1; MG/1
1 TABLET, EXTENDED RELEASE ORAL ONCE
Status: COMPLETED | OUTPATIENT
Start: 2022-08-01 | End: 2022-08-01

## 2022-08-01 NOTE — ED INITIAL ASSESSMENT (HPI)
Pt presents to ED via EMS after she lost her foot and fell backwards. Pt states she hit the back of her head on a cabinet. Hematoma noted to posterior head. Denies LOC. Denies blood thinners. Pt c/o headaches.

## 2023-01-04 ENCOUNTER — HOSPITAL ENCOUNTER (OUTPATIENT)
Dept: ULTRASOUND IMAGING | Facility: HOSPITAL | Age: 88
Discharge: HOME | End: 2023-01-04
Attending: NURSE PRACTITIONER
Payer: MEDICARE

## 2023-01-04 DIAGNOSIS — R10.11 RUQ ABDOMINAL PAIN: ICD-10-CM

## 2023-01-04 PROCEDURE — 76856 US EXAM PELVIC COMPLETE: CPT | Performed by: NURSE PRACTITIONER

## 2023-03-04 ENCOUNTER — HOSPITAL ENCOUNTER (OUTPATIENT)
Dept: BONE DENSITY | Age: 88
Discharge: HOME | End: 2023-03-04
Attending: INTERNAL MEDICINE
Payer: MEDICARE

## 2023-03-04 DIAGNOSIS — Z78.0 ASYMPTOMATIC MENOPAUSAL STATE: ICD-10-CM

## 2023-03-04 PROCEDURE — 77080 DXA BONE DENSITY AXIAL: CPT | Performed by: INTERNAL MEDICINE

## 2023-07-10 ENCOUNTER — TELEPHONE (OUTPATIENT)
Dept: INFUSION THERAPY | Age: 88
End: 2023-07-10

## 2023-07-20 ENCOUNTER — CLINICAL ABSTRACT (OUTPATIENT)
Dept: INFUSION THERAPY | Age: 88
End: 2023-07-20

## 2023-07-20 PROBLEM — M85.89 OSTEOPENIA OF MULTIPLE SITES: Status: ACTIVE | Noted: 2023-07-20

## 2023-08-17 ENCOUNTER — CLINICAL ABSTRACT (OUTPATIENT)
Dept: INFUSION THERAPY | Age: 88
End: 2023-08-17

## 2023-09-30 ENCOUNTER — OFFICE VISIT (OUTPATIENT)
Dept: OTOLARYNGOLOGY | Facility: CLINIC | Age: 88
End: 2023-09-30

## 2023-09-30 VITALS — HEIGHT: 64.5 IN | WEIGHT: 130 LBS | BODY MASS INDEX: 21.93 KG/M2

## 2023-09-30 DIAGNOSIS — H61.23 CERUMEN DEBRIS ON TYMPANIC MEMBRANE OF BOTH EARS: Primary | ICD-10-CM

## 2023-09-30 NOTE — PROGRESS NOTES
Ears = bilateral cerumen occlussions. Fully cleaned under microscope using instrumentation and suctioning. Normal tympanic membranes. Follow-up in 1 years time, sooner if problems.

## 2023-10-06 NOTE — ANESTHESIA PROCEDURE NOTES
Spinal Block  Date/Time: 9/10/2019 11:40 AM  Performed by: Mely Garcia MD  Authorized by: Mely Garcia MD     Patient Location:  OR  Start Time:  9/10/2019 11:41 AM  End Time:  9/10/2019 11:52 AM  Site identification: surface landmarks    Reason for B 5

## 2023-10-09 ENCOUNTER — HOSPITAL ENCOUNTER (OUTPATIENT)
Dept: INFUSION THERAPY | Age: 88
Discharge: STILL ADMITTED OR ADMINISTRATIVE | End: 2023-10-09
Attending: INTERNAL MEDICINE

## 2023-10-09 VITALS
RESPIRATION RATE: 16 BRPM | DIASTOLIC BLOOD PRESSURE: 82 MMHG | HEART RATE: 78 BPM | OXYGEN SATURATION: 97 % | SYSTOLIC BLOOD PRESSURE: 156 MMHG | BODY MASS INDEX: 21 KG/M2 | TEMPERATURE: 98.2 F | WEIGHT: 132.06 LBS

## 2023-10-09 DIAGNOSIS — M85.89 OSTEOPENIA OF MULTIPLE SITES: Primary | ICD-10-CM

## 2023-10-09 PROCEDURE — 10002800 HB RX 250 W HCPCS: Performed by: INTERNAL MEDICINE

## 2023-10-09 PROCEDURE — 96372 THER/PROPH/DIAG INJ SC/IM: CPT | Performed by: INTERNAL MEDICINE

## 2023-10-09 RX ADMIN — DENOSUMAB 60 MG: 60 INJECTION SUBCUTANEOUS at 12:03

## 2023-10-19 ENCOUNTER — EXTERNAL RECORD (OUTPATIENT)
Dept: OTHER | Age: 88
End: 2023-10-19

## 2023-11-04 ENCOUNTER — OFFICE VISIT (OUTPATIENT)
Dept: DERMATOLOGY CLINIC | Facility: CLINIC | Age: 88
End: 2023-11-04

## 2023-11-04 DIAGNOSIS — L57.0 AK (ACTINIC KERATOSIS): ICD-10-CM

## 2023-11-04 DIAGNOSIS — L82.1 SEBORRHEIC KERATOSES: ICD-10-CM

## 2023-11-04 DIAGNOSIS — Z85.828 HISTORY OF NONMELANOMA SKIN CANCER: ICD-10-CM

## 2023-11-04 DIAGNOSIS — D23.9 BENIGN NEOPLASM OF SKIN, UNSPECIFIED LOCATION: ICD-10-CM

## 2023-11-04 DIAGNOSIS — L81.4 LENTIGO: ICD-10-CM

## 2023-11-04 DIAGNOSIS — D48.5 NEOPLASM OF UNCERTAIN BEHAVIOR OF SKIN: Primary | ICD-10-CM

## 2023-11-04 PROBLEM — G20.A1 PARKINSON'S DISEASE: Status: ACTIVE | Noted: 2023-11-04

## 2023-11-04 PROBLEM — G20.A1 PARKINSON'S DISEASE (HCC): Status: ACTIVE | Noted: 2023-11-04

## 2023-11-04 PROCEDURE — 1160F RVW MEDS BY RX/DR IN RCRD: CPT | Performed by: DERMATOLOGY

## 2023-11-04 PROCEDURE — 11102 TANGNTL BX SKIN SINGLE LES: CPT | Performed by: DERMATOLOGY

## 2023-11-04 PROCEDURE — 99213 OFFICE O/P EST LOW 20 MIN: CPT | Performed by: DERMATOLOGY

## 2023-11-04 PROCEDURE — 88305 TISSUE EXAM BY PATHOLOGIST: CPT | Performed by: DERMATOLOGY

## 2023-11-04 PROCEDURE — 1159F MED LIST DOCD IN RCRD: CPT | Performed by: DERMATOLOGY

## 2023-11-05 NOTE — PROGRESS NOTES
Operative Report                     Shave/  Tangential biopsy     Clinical diagnosis:   Right chest crusted pearly papule 1cm r/o BCC , pt with Parkinson's, hx NMSC,   Size of lesion:    Location:    Procedure: With patient in appropriate position the skin of the above was scrubbed with alcohol. Anesthesia was obtained with 1% Xylocaine with epinephrine. The skin surrounding the lesion was placed under tension and the lesion was incised using a #15 scalpel blade. The specimen was sent for histopathologic exam.    Hemostasis was obtained with electrocautery/aluminum chloride. Estimated blood loss less than 2 cc. Biopsy dressed with Polysporin, bandage. Pressure dressing:   No    Complications: None    Written instructions given and reviewed with patient    Await pathology    Contact information reviewed.     Procedural physician:  Ramo Jimenez MD

## 2023-11-05 NOTE — PROGRESS NOTES
Krzysztof Arellano is a 81year old female. HPI:     CC:  Patient presents with:  Full Skin Exam: LOV 5/21/22. Patient present for Full Body Skin exam. Has c/o bothersome skin growths on her mid back, that she would like to have removed. Has personal Hx of Ak's and BCC. Allergies:  Biaxin [Clarithromycin], Celebrex [Celecoxib], Neurontin [Gabapentin], Nsaids, Pcn [Penicillins], Radiology Contrast Iodinated Dyes, and Bactrim [Sulfamethoxazole W/Trimethoprim]    HISTORY:    Past Medical History:   Diagnosis Date    Abscess of tonsil     per [facility]: x2    AV node dysfunction     BCC (basal cell carcinoma) 2021    right chin    Diverticulitis     Esophageal reflux     H/O cystoscopy 11/05/2013    per [facility]: cystoscopy with bilateral retrograde pyelography 2013    High cholesterol     History of pregnancy     per [facility]: 4 vaginal deliveries    Incontinence     Mitral valve prolapse     per [facility]:     Neuropathy     fingers, feet    Osteoarthritis     Pacemaker     Parkinson disease     Ureteropelvic junction obstruction 1963    per [facility]:      Viral encephalitis     per [facility]:     Visual impairment     glasses      Past Surgical History:   Procedure Laterality Date    CARDIAC PACEMAKER PLACEMENT  7597,3082    D & C      KIDNEY SURGERY Right 1963    PACEMAKER/DEFIBRILLATOR      TOTAL KNEE REPLACEMENT Right 11/06/2018    DR. DURAN      Family History   Problem Relation Age of Onset    Breast Cancer Paternal Aunt 61    Heart Disease Father         CAD    Hypertension Father     Cancer Father         [address]    Cancer Mother 50        stomach cancer     Cancer Other         NO family h/o bladder or kidney cancer    Kidney Disease Other         NO family h/o urolithiasis      Social History     Socioeconomic History    Marital status:     Tobacco Use    Smoking status: Former     Packs/day: 0.75     Years: 10.00     Additional pack years: 0.00     Total pack years: 7.50     Types: Cigarettes     Quit date: 9/3/1968     Years since quittin.2    Smokeless tobacco: Never   Vaping Use    Vaping Use: Never used   Substance and Sexual Activity    Alcohol use: Yes     Alcohol/week: 0.8 standard drinks of alcohol     Types: 1 Standard drinks or equivalent per week     Comment: Occ    Drug use: No   Other Topics Concern    Caffeine Concern Yes     Comment: decaf coffee, 1 cup     Reaction to local anesthetic No    Pt has a pacemaker Yes    Pt has a defibrillator No        Current Outpatient Medications   Medication Sig Dispense Refill    ROSUVASTATIN 10 MG Oral Tab TAKE ONE TABLET BY MOUTH ONE TIME DAILY 90 tablet 1    Calcium Carbonate Antacid 500 MG Oral Chew Tab Chew 1 tablet (500 mg total) by mouth every 4 (four) hours as needed. 0    Pantoprazole Sodium 40 MG Oral Tab EC Take 1 tablet (40 mg total) by mouth every morning before breakfast.  0    aspirin 81 MG Oral Tab Take 1 tablet (81 mg total) by mouth 2 (two) times daily. docusate sodium 100 MG Oral Cap Take 100 mg by mouth 2 (two) times daily. 20 capsule 0    melatonin 3 MG Oral Tab Take 1 tablet (3 mg total) by mouth nightly. Senna 8.6 MG Oral Tab Take 1 tablet (8.6 mg total) by mouth nightly. Methylcellulose, Laxative, Oral Powder Take by mouth daily. carbidopa-levodopa  MG Oral Tablet Dispersible Take 0.5 tablets by mouth as needed. Coenzyme Q10 (COQ10 OR) Take 20 mg by mouth daily. carbidopa-levodopa  MG Oral Tab Take 1.5 tablets by mouth 3 (three) times daily. Misc Natural Products (OSTEO BI-FLEX ADV DOUBLE ST OR) Take 2 tablets by mouth daily. Polyethylene Glycol 3350 (MIRALAX OR) Take 17 g by mouth daily. Multiple Vitamin (MULTI-VITAMINS) Oral Tab Take 1 tablet by mouth daily.        Allergies:     Biaxin [Clarithromy*    RASH  Celebrex [Celecoxib]    OTHER (SEE COMMENTS)    Comment:Abdominal pain  Neurontin [Gabapent*    OTHER (SEE COMMENTS)    Comment:shaky  Nsaids                  OTHER (SEE COMMENTS)    Comment:Abdominal pain  Pcn [Penicillins]       RASH  Radiology Contrast *      Bactrim [Sulfametho*    DIARRHEA    Past Medical History:   Diagnosis Date    Abscess of tonsil     per [facility]: x2    AV node dysfunction     BCC (basal cell carcinoma)     right chin    Diverticulitis     Esophageal reflux     H/O cystoscopy 2013    per [facility]: cystoscopy with bilateral retrograde pyelography     High cholesterol     History of pregnancy     per [facility]: 4 vaginal deliveries    Incontinence     Mitral valve prolapse     per [facility]:     Neuropathy     fingers, feet    Osteoarthritis     Pacemaker     Parkinson disease     Ureteropelvic junction obstruction     per [facility]:      Viral encephalitis     per [facility]:     Visual impairment     glasses     Past Surgical History:   Procedure Laterality Date    CARDIAC PACEMAKER PLACEMENT  8163,6604    D & C      KIDNEY SURGERY Right 1963    PACEMAKER/DEFIBRILLATOR      TOTAL KNEE REPLACEMENT Right 2018    DR. DURAN     Social History    Socioeconomic History      Marital status:       Spouse name: Not on file      Number of children: Not on file      Years of education: Not on file      Highest education level: Not on file    Occupational History      Not on file    Tobacco Use      Smoking status: Former        Packs/day: 0.75        Years: 10.00        Additional pack years: 0.00        Total pack years: 7.50        Types: Cigarettes        Quit date: 9/3/1968        Years since quittin.2      Smokeless tobacco: Never    Vaping Use      Vaping Use: Never used    Substance and Sexual Activity      Alcohol use: Yes        Alcohol/week: 0.8 standard drinks of alcohol        Types: 1 Standard drinks or equivalent per week        Comment:  Occ      Drug use: No      Sexual activity: Not on file    Other Topics      Concerns:         Service: Not Asked        Blood Transfusions: Not Asked        Caffeine Concern: Yes          decaf coffee, 1 cup         Occupational Exposure: Not Asked        Hobby Hazards: Not Asked        Sleep Concern: Not Asked        Stress Concern: Not Asked        Weight Concern: Not Asked        Special Diet: Not Asked        Back Care: Not Asked        Exercise: Not Asked        Bike Helmet: Not Asked        Seat Belt: Not Asked        Self-Exams: Not Asked        Grew up on a farm: Not Asked        History of tanning: Not Asked        Outdoor occupation: Not Asked        Breast feeding: Not Asked        Reaction to local anesthetic: No        Pt has a pacemaker: Yes        Pt has a defibrillator: No    Social History Narrative      Not on file    Social Determinants of Health  Financial Resource Strain: Not on file  Food Insecurity: Not on file  Transportation Needs: Not on file  Physical Activity: Not on file  Stress: Not on file  Social Connections: Not on file  Housing Stability: Not on file  Family History   Problem Relation Age of Onset    Breast Cancer Paternal Aunt 61    Heart Disease Father         CAD    Hypertension Father     Cancer Father         [facility]    Cancer Mother 50        stomach cancer     Cancer Other         NO family h/o bladder or kidney cancer    Kidney Disease Other         NO family h/o urolithiasis       There were no vitals filed for this visit. HPI:    Patient presents with:  Full Skin Exam: LOV 5/21/22. Patient present for Full Body Skin exam. Has c/o bothersome skin growths on her mid back, that she would like to have removed. Has personal Hx of Ak's and BCC. Follow-up BCC right chin post ENC10/21  Well-healed. Has several concerns on left leg back left flank  Past notes/ records and appropriate/relevant lab results including pathology and past body maps reviewed. Updated and new information noted in current visit. patient previously had worked with Dr. Get Garcia. Patient had several removals in the past.  No personal family history of skin cancer. Multiple underlying health conditions as noted including Parkinson's. Concern regarding multiple skin lesions and elevated risk of skin cancer in Parkinson's disease . Patient's father with history of [address]  Concern with multiple lesions over the back and arms     patient presents with concerns above. Patient has been in their usual state of health. History, medications, allergies reviewed as noted. ROS:  Denies any other systemic complaints. No new or changeing lesions other than noted above. No fevers, chills, night sweats, unusual sun sensitivity. No other skin complaints. History, medications, allergies reviewed as noted. Physical Examination:     Well-developed well-nourished patient alert oriented in no acute distress. Exam total-body performed, including scalp, head, neck, face,nails, hair, external eyes, including conjunctival mucosa, eyelids, lips external ears, back, chest,/ breasts, axillae,  abdomen, arms, legs, palms. Multiple light to medium brown, well marginated, uniformly pigmented, macules and papules 6 mm and less scattered on exam. pigmented lesions examined with dermoscopy benign-appearing patterns. Waxy tannish keratotic papules scattered, cherry-red vascular papules scattered. See map today's date for lesions noted . Otherwise remarkable for lesions as noted on map. See details of examination  See Assessment /Plan for additional history and physical exam also:    Assessment / plan:    Orders Placed This Encounter      Specimen to Pathology, Tissue [IHP Pt to [address] for this Visit:  Requested Prescriptions      No prescriptions requested or ordered in this encounter         Neoplasm of uncertain behavior of skin  (primary encounter diagnosis)  Ak (actinic keratosis)  Seborrheic keratoses  Lentigo  Benign neoplasm of skin, unspecified location  History of nonmelanoma skin cancer    See details on map.       Remarkable for: Right chest crusted pearly papule 1cm r/o BCC , pt with Parkinson's, hx NMSC,   Shave/ tangential biopsy performed, operative note and consent in chart further plans pending pathology    Keratotic verrucous pedunculated papule at left posterior lateral bra [id number]  Scissors removal, irritated skin tags medically necessary as inflamed. Lesions treated with scissors removal  Medically necessary as lesion inflamed and irritated. #1    History BCC right chin post electrodesiccation curettage 10/21 well-healed no evidence recurrence. Actinic Keratoses. Precancerous nature discussed. Sun protection, sunscreen/ blocks encouraged . Monitoring for new lesions. Sun damage additional recurrent and new actinic keratoses, skin cancers may occur in areas of prior actinic keratoses, related to past sun exposure to minimize current sun exposure. Sunscreen applied consistently regularly, reapplication and sun protection while driving recommended. SK left posterior leg reassurance      Risk of additional skin cancers with her Parkinson's discussed. Continue careful monitoring sun protection    Waxy tan papule at right brow, lentigines, keratoses over the arms hands back tan papule consistent with SK at lower leg as well  Waxy tan keratotic papules lesions in areas of concern as noted reassurance given. Benign nature discussed. Possibility of cryo, alphahydroxy acids over-the-counter retinol's discussed. Many lesions along the chest back arms legs . Along the central chest, mid abdomen more stucco like keratoses more diffuse over the lower legs. No other suspicious lesions. More prominent stasis changes over the feet and ankles reassurance monitor moisturizer, manage edema. Overall patient doing well  Recheck 6 months or as needed  Reassurance around benign seborrheic keratoses over the back chest arms reassurance given. Consider cryo to more bothersome lesions.     Moderate sun damage scattered lentigines ephelides continue careful sun protection. No suspicious lesions currently. With history of Parkinson's disease, BCC AK's more consistent follow-up in 6 months or as needed    No other suspicious lesions currently    Discussed alphahydroxy acid lotions for stucco keratoses, more inflamed keratoses such as AmLactin or [place] psoriasis cream  Please refer to map for specific lesions. See additional diagnoses. Pros cons of various therapies, risks benefits discussed. Pathophysiology discussed with patient. Therapeutic options reviewed. See  Medications in grid. Instructions reviewed at length. Benign nevi, seborrheic  keratoses, cherry angiomas:  Reassurance regarding other benign skin lesions. Signs and symptoms of skin cancer, ABCDE's of melanoma discussed with patient. Sunscreen use, sun protection, self exams reviewed. Followup as noted RTC routine checkup 6 mos - one year or p.r.n. Encounter Times  Including precharting, reviewing chart, prior notes obtaining history: 5 minutes, medical exam :10 minutes, notes on body map, plan, counseling 10minutes My total time spent caring for the patient on the day of the encounter: 25 minutes       The patient indicates understanding of these issues and agrees to the plan. The patient is asked to return as noted in follow-up/ above. This note was generated using Dragon voice recognition software. Please contact me regarding any confusion resulting from errors in recognition.

## 2023-11-26 ENCOUNTER — HOSPITAL ENCOUNTER (OUTPATIENT)
Age: 88
Discharge: HOME | End: 2023-11-26
Payer: MEDICARE

## 2023-11-26 VITALS
SYSTOLIC BLOOD PRESSURE: 116 MMHG | TEMPERATURE: 98 F | HEART RATE: 84 BPM | OXYGEN SATURATION: 99 % | RESPIRATION RATE: 16 BRPM | DIASTOLIC BLOOD PRESSURE: 65 MMHG

## 2023-11-26 DIAGNOSIS — N30.00 ACUTE CYSTITIS WITHOUT HEMATURIA: Primary | ICD-10-CM

## 2023-11-26 LAB
BILIRUB UR QL STRIP: NEGATIVE
CLARITY UR: CLEAR
COLOR UR: YELLOW
GLUCOSE UR STRIP-MCNC: NEGATIVE MG/DL
HGB UR QL STRIP: NEGATIVE
KETONES UR STRIP-MCNC: NEGATIVE MG/DL
NITRITE UR QL STRIP: NEGATIVE
PH UR STRIP: 6 [PH]
PROT UR STRIP-MCNC: NEGATIVE MG/DL
SP GR UR STRIP: <=1.005
UROBILINOGEN UR STRIP-ACNC: <2 MG/DL

## 2023-11-26 PROCEDURE — 99213 OFFICE O/P EST LOW 20 MIN: CPT

## 2023-11-26 PROCEDURE — 81002 URINALYSIS NONAUTO W/O SCOPE: CPT

## 2023-11-26 RX ORDER — CEFADROXIL 500 MG/1
500 CAPSULE ORAL 2 TIMES DAILY
Qty: 14 CAPSULE | Refills: 0 | Status: SHIPPED | OUTPATIENT
Start: 2023-11-26 | End: 2023-12-03

## 2023-11-26 RX ORDER — CEFADROXIL 500 MG/1
500 CAPSULE ORAL 2 TIMES DAILY
Qty: 14 CAPSULE | Refills: 0 | Status: SHIPPED | OUTPATIENT
Start: 2023-11-26 | End: 2023-11-26

## 2023-11-26 NOTE — DISCHARGE INSTRUCTIONS
Your urine showed signs of a urinary tract infection. Please take the antibiotics as prescribed. We will send the urine for culture and call you in 2 days if the antibiotic needs to change. Please call your primary care doctor for close follow-up later this week. If you develop any fever, weakness, dizziness, abdominal pain, vomiting, chest pain, shortness of breath or any other concerning complaints you should call 911 or go to the emergency department. Drink lots of fluids. Take Tylenol or Motrin for pain or fever. You can take a probiotic to prevent yeast infections. If you develop any abdominal pain, back pain, vomiting or any other concerning complaints you should go to the emergency department. Otherwise follow-up with your primary care doctor.

## 2023-12-21 ENCOUNTER — HOSPITAL ENCOUNTER (OUTPATIENT)
Age: 88
Discharge: HOME | End: 2023-12-21
Payer: MEDICARE

## 2023-12-21 VITALS
DIASTOLIC BLOOD PRESSURE: 77 MMHG | OXYGEN SATURATION: 99 % | TEMPERATURE: 98 F | RESPIRATION RATE: 18 BRPM | SYSTOLIC BLOOD PRESSURE: 120 MMHG | HEART RATE: 86 BPM

## 2023-12-21 DIAGNOSIS — N30.00 ACUTE CYSTITIS WITHOUT HEMATURIA: ICD-10-CM

## 2023-12-21 DIAGNOSIS — R30.0 DYSURIA: Primary | ICD-10-CM

## 2023-12-21 LAB
BILIRUB UR QL STRIP: NEGATIVE
CLARITY UR: CLEAR
COLOR UR: YELLOW
GLUCOSE UR STRIP-MCNC: NEGATIVE MG/DL
KETONES UR STRIP-MCNC: NEGATIVE MG/DL
NITRITE UR QL STRIP: POSITIVE
PH UR STRIP: 7 [PH]
PROT UR STRIP-MCNC: NEGATIVE MG/DL
SP GR UR STRIP: 1.01
UROBILINOGEN UR STRIP-ACNC: <2 MG/DL

## 2023-12-21 PROCEDURE — 81002 URINALYSIS NONAUTO W/O SCOPE: CPT | Performed by: NURSE PRACTITIONER

## 2023-12-21 PROCEDURE — 99213 OFFICE O/P EST LOW 20 MIN: CPT | Performed by: NURSE PRACTITIONER

## 2023-12-21 RX ORDER — NITROFURANTOIN 25; 75 MG/1; MG/1
100 CAPSULE ORAL 2 TIMES DAILY
Qty: 14 CAPSULE | Refills: 0 | Status: SHIPPED | OUTPATIENT
Start: 2023-12-21 | End: 2023-12-28

## 2023-12-21 RX ORDER — NITROFURANTOIN 25; 75 MG/1; MG/1
100 CAPSULE ORAL 2 TIMES DAILY
Qty: 14 CAPSULE | Refills: 0 | Status: SHIPPED | OUTPATIENT
Start: 2023-12-21 | End: 2023-12-21

## 2023-12-21 NOTE — ED INITIAL ASSESSMENT (HPI)
Pt presents with daughter with c/o urinary symptoms beginning Sunday (dysuria). Denies n/v/d or fevers. Had UTI earlier this month. Finished abx.

## 2023-12-21 NOTE — DISCHARGE INSTRUCTIONS
Take Macrobid twice a day until gone. Continue drinking the cranberry juice. Increase water intake. Urine culture is pending and results will take about 48 hours.   Follow-up with your primary doctor

## 2024-01-07 ENCOUNTER — HOSPITAL ENCOUNTER (OUTPATIENT)
Age: 89
Discharge: HOME | End: 2024-01-07
Payer: MEDICARE

## 2024-01-07 VITALS
DIASTOLIC BLOOD PRESSURE: 69 MMHG | OXYGEN SATURATION: 100 % | SYSTOLIC BLOOD PRESSURE: 122 MMHG | RESPIRATION RATE: 20 BRPM | TEMPERATURE: 98 F | HEART RATE: 76 BPM

## 2024-01-07 DIAGNOSIS — R30.0 DYSURIA: ICD-10-CM

## 2024-01-07 DIAGNOSIS — R31.9 URINARY TRACT INFECTION WITH HEMATURIA, SITE UNSPECIFIED: Primary | ICD-10-CM

## 2024-01-07 DIAGNOSIS — N39.0 URINARY TRACT INFECTION WITH HEMATURIA, SITE UNSPECIFIED: Primary | ICD-10-CM

## 2024-01-07 LAB
BILIRUB UR QL STRIP: NEGATIVE
COLOR UR: YELLOW
GLUCOSE UR STRIP-MCNC: NEGATIVE MG/DL
KETONES UR STRIP-MCNC: NEGATIVE MG/DL
NITRITE UR QL STRIP: NEGATIVE
PH UR STRIP: 6.5 [PH]
PROT UR STRIP-MCNC: 30 MG/DL
SP GR UR STRIP: 1.02
UROBILINOGEN UR STRIP-ACNC: <2 MG/DL

## 2024-01-07 PROCEDURE — 81002 URINALYSIS NONAUTO W/O SCOPE: CPT | Performed by: NURSE PRACTITIONER

## 2024-01-07 PROCEDURE — 99213 OFFICE O/P EST LOW 20 MIN: CPT | Performed by: NURSE PRACTITIONER

## 2024-01-07 RX ORDER — NITROFURANTOIN 25; 75 MG/1; MG/1
100 CAPSULE ORAL 2 TIMES DAILY
Qty: 10 CAPSULE | Refills: 0 | Status: SHIPPED | OUTPATIENT
Start: 2024-01-07 | End: 2024-01-12

## 2024-01-07 RX ORDER — NITROFURANTOIN 25; 75 MG/1; MG/1
100 CAPSULE ORAL 2 TIMES DAILY
Qty: 10 CAPSULE | Refills: 0 | Status: SHIPPED | OUTPATIENT
Start: 2024-01-07 | End: 2024-01-07

## 2024-01-07 NOTE — DISCHARGE INSTRUCTIONS
Follow-up with your urologist.  Start the antibiotic as prescribed finish it completely a urine culture is being sent out if it grows a bacteria showing resistance to the antibiotic prescribed you will be notified antibiotic will be changed.  If you develop abdominal pain back pain fevers or chills nausea or vomiting or any new or worsening symptoms go to the nearest emergency department.

## 2024-01-07 NOTE — ED PROVIDER NOTES
Patient Seen in: [facility]      History     Chief Complaint   Patient presents with    Urinary Symptoms     Stated Complaint: Urinary Symptoms    Subjective:   HPI    This is a 88-year-old female presenting with urinary symptoms.  Patient states she has had burning with urination for about 2 days.  Denies any fever abdominal pain back pain nausea vomiting.  Patient states she has a history of getting infections lately she has been getting them more often she does see a urologist.  Denies any history of pyelonephritis.    Objective:   Past Medical History:   Diagnosis Date    Abscess of tonsil     per [facility]: x2    AV node dysfunction     BCC (basal cell carcinoma of skin)     BCC (basal cell carcinoma)     right chin    Diverticulitis     Esophageal reflux     H/O cystoscopy 2013    per [facility]: cystoscopy with bilateral retrograde pyelography     High cholesterol     History of pregnancy     per [facility]: 4 vaginal deliveries    Incontinence     Mitral valve prolapse     per [facility]:     Neuropathy     fingers, feet    Osteoarthritis     Pacemaker     Parkinson disease     Ureteropelvic junction obstruction 1963    per [facility]:      Viral encephalitis     per [facility]:     Visual impairment     glasses              Past Surgical History:   Procedure Laterality Date    CARDIAC PACEMAKER PLACEMENT  ,    D & C      KIDNEY SURGERY Right 1963    PACEMAKER/DEFIBRILLATOR      TOTAL KNEE REPLACEMENT Right 2018    DR. DURAN                Social History     Socioeconomic History    Marital status:    Tobacco Use    Smoking status: Former     Packs/day: 0.75     Years: 10.00     Additional pack years: 0.00     Total pack years: 7.50     Types: Cigarettes     Quit date: 9/3/1968     Years since quittin.3    Smokeless tobacco: Never   Vaping Use    Vaping Use: Never used   Substance and Sexual Activity    Alcohol use: Yes     Alcohol/week: 0.8 standard drinks of  alcohol     Types: 1 Standard drinks or equivalent per week     Comment: Occ    Drug use: No   Other Topics Concern    Caffeine Concern Yes     Comment: decaf coffee, 1 cup     Reaction to local anesthetic No    Pt has a pacemaker Yes    Pt has a defibrillator No              Review of Systems    Positive for stated complaint: Urinary Symptoms  Other systems are as noted in HPI.  Constitutional and vital signs reviewed.      All other systems reviewed and negative except as noted above.    Physical Exam     ED Triage Vitals [01/07/24 1106]   /69   Pulse 76   Resp 20   Temp 98.1 °F (36.7 °C)   Temp src Temporal   SpO2 100 %   O2 Device None (Room air)       Current:/69   Pulse 76   Temp 98.1 °F (36.7 °C) (Temporal)   Resp 20   SpO2 100%         Physical Exam  Vitals and nursing note reviewed.   Constitutional:       Appearance: Normal appearance.   HENT:      Right Ear: External ear normal.      Left Ear: External ear normal.      Nose: Nose normal.      Mouth/Throat:      Mouth: Mucous membranes are moist.      Pharynx: Oropharynx is clear.   Eyes:      Conjunctiva/sclera: Conjunctivae normal.   Cardiovascular:      Rate and Rhythm: Normal rate.   Pulmonary:      Effort: Pulmonary effort is normal.   Abdominal:      Palpations: Abdomen is soft.      Tenderness: There is no abdominal tenderness. There is no right CVA tenderness or left CVA tenderness.   Musculoskeletal:         General: Normal range of motion.      Cervical back: Normal range of motion.   Neurological:      Mental Status: She is alert and oriented to person, place, and time.               ED Course     Labs Reviewed   [facility] POCT URINALYSIS DIPSTICK - Abnormal; Notable for the following components:       Result Value    Urine Clarity Cloudy (*)     Protein urine 30 (*)     Blood, Urine Small (*)     Leukocyte esterase urine Moderate (*)     All other components within normal limits   URINE CULTURE, ROUTINE                      MDM         Medical Decision Making  88-year-old female well-appearing and nontoxic afebrile no hypoxia or distress presenting with urinary symptoms concern for UTI versus dysuria no clinical indication for labs or imaging but a urine dip and culture will be collected.     Urine dip is resulted above concerning for UTI as there is leukocytes and blood.  Urine culture will be sent and patient will be started on nitrofurantoin.  Discussed this with patient and family at bedside.  Discussed pushing fluids and following up with her urologist.  Discussed strict ER precautions.  Patient and family acknowledged understanding discharge instructions.        Problems Addressed:  Dysuria: acute illness or injury  Urinary tract infection with hematuria, site unspecified: acute illness or injury    Amount and/or Complexity of Data Reviewed  Labs:  Decision-making details documented in ED Course.    Risk  Prescription drug management.        Disposition and Plan     Clinical Impression:  1. Urinary tract infection with hematuria, site unspecified    2. Dysuria         Disposition:  Discharge  1/7/2024 11:10 am    Follow-up:  Krista Le MD  [address] [place] [address] [id number] [phone]    Schedule an appointment as soon as possible for a visit in 3 days            Medications Prescribed:  Discharge Medication List as of 1/7/2024 11:10 AM

## 2024-01-24 ENCOUNTER — HOSPITAL ENCOUNTER (OUTPATIENT)
Dept: CT IMAGING | Facility: HOSPITAL | Age: 89
Discharge: HOME | End: 2024-01-24
Attending: NURSE PRACTITIONER
Payer: MEDICARE

## 2024-01-24 DIAGNOSIS — Z87.440 RECENT URINARY TRACT INFECTION: ICD-10-CM

## 2024-01-24 DIAGNOSIS — R31.29 OTHER MICROSCOPIC HEMATURIA: ICD-10-CM

## 2024-01-24 PROCEDURE — 74176 CT ABD & PELVIS W/O CONTRAST: CPT | Performed by: NURSE PRACTITIONER

## 2024-02-13 ENCOUNTER — APPOINTMENT (OUTPATIENT)
Dept: CT IMAGING | Facility: HOSPITAL | Age: 89
End: 2024-02-13
Attending: NURSE PRACTITIONER
Payer: MEDICARE

## 2024-02-13 ENCOUNTER — HOSPITAL ENCOUNTER (EMERGENCY)
Facility: HOSPITAL | Age: 89
Discharge: HOME | End: 2024-02-13
Payer: MEDICARE

## 2024-02-13 ENCOUNTER — CLINICAL ABSTRACT (OUTPATIENT)
Dept: INFUSION THERAPY | Age: 89
End: 2024-02-13

## 2024-02-13 ENCOUNTER — APPOINTMENT (OUTPATIENT)
Dept: GENERAL RADIOLOGY | Facility: HOSPITAL | Age: 89
End: 2024-02-13
Attending: NURSE PRACTITIONER
Payer: MEDICARE

## 2024-02-13 VITALS
SYSTOLIC BLOOD PRESSURE: 128 MMHG | BODY MASS INDEX: 21.51 KG/M2 | TEMPERATURE: 98 F | WEIGHT: 126 LBS | HEIGHT: 64 IN | RESPIRATION RATE: 18 BRPM | HEART RATE: 78 BPM | OXYGEN SATURATION: 100 % | DIASTOLIC BLOOD PRESSURE: 81 MMHG
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
## 2024-02-13 DIAGNOSIS — S09.90XA CLOSED HEAD INJURY, INITIAL ENCOUNTER: ICD-10-CM

## 2024-02-13 PROCEDURE — 99284 EMERGENCY DEPT VISIT MOD MDM: CPT

## 2024-02-13 PROCEDURE — 73030 X-RAY EXAM OF SHOULDER: CPT | Performed by: NURSE PRACTITIONER

## 2024-02-13 PROCEDURE — 70450 CT HEAD/BRAIN W/O DYE: CPT | Performed by: NURSE PRACTITIONER

## 2024-02-13 PROCEDURE — 72125 CT NECK SPINE W/O DYE: CPT | Performed by: NURSE PRACTITIONER

## 2024-02-14 NOTE — ED INITIAL ASSESSMENT (HPI)
Pt to ed c/o fall and head pain. Pt states she had a fall yesterday around dinner time and hit her head. Pt denies loc or taking blood thinners.

## 2024-02-14 NOTE — ED PROVIDER NOTES
Patient Seen in: [facility] Emergency Department      History     Chief Complaint   Patient presents with    Fall     Stated Complaint: Fall    Subjective:   89yo/f w hx of GERD, HTN, HLD, parkinsons, MVP, on 81mg enteric coated asa reports to the ED w c/o a head injury. Patient reports she was in her kitchen yesterday and was walking backwards and lost her footing, fell back, struck her head. ??maybe left shoulder but has chronic pain to left shoulder. No loc. No weakness. No vomiting. No vision changes. Has some neck pain earlier, now resolved.             Objective:   Past Medical History:   Diagnosis Date    Abscess of tonsil     per [facility]: x2    AV node dysfunction     BCC (basal cell carcinoma of skin)     BCC (basal cell carcinoma)     right chin    Diverticulitis     Esophageal reflux     H/O cystoscopy 2013    per Next[employer]: cystoscopy with bilateral retrograde pyelography     High cholesterol     History of pregnancy     per Next[employer]: 4 vaginal deliveries    Incontinence     Mitral valve prolapse     per [employer]:     Neuropathy     fingers, feet    Osteoarthritis     Pacemaker     Parkinson disease     Ureteropelvic junction obstruction 1963    per NextGen:      Viral encephalitis     per Next[employer]:     Visual impairment     glasses              Past Surgical History:   Procedure Laterality Date    CARDIAC PACEMAKER PLACEMENT  ,    D & C      KIDNEY SURGERY Right 1963    PACEMAKER/DEFIBRILLATOR      TOTAL KNEE REPLACEMENT Right 2018    DR. DURAN                Social History     Socioeconomic History    Marital status:    Tobacco Use    Smoking status: Former     Packs/day: 0.75     Years: 10.00     Additional pack years: 0.00     Total pack years: 7.50     Types: Cigarettes     Quit date: 9/3/1968     Years since quittin.4    Smokeless tobacco: Never   Vaping Use    Vaping Use: Never used   Substance and Sexual Activity    Alcohol use: Yes      Alcohol/week: 0.8 standard drinks of alcohol     Types: 1 Standard drinks or equivalent per week     Comment: Occ    Drug use: No   Other Topics Concern    Caffeine Concern Yes     Comment: decaf coffee, 1 cup     Reaction to local anesthetic No    Pt has a pacemaker Yes    Pt has a defibrillator No              Review of Systems   All other systems reviewed and are negative.      Positive for stated complaint: Fall  Other systems are as noted in HPI.  Constitutional and vital signs reviewed.      All other systems reviewed and negative except as noted above.    Physical Exam     ED Triage Vitals   BP 02/13/24 2048 102/53   Pulse 02/13/24 2045 97   Resp 02/13/24 2045 18   Temp 02/13/24 2045 98.1 °F (36.7 °C)   Temp src 02/13/24 2045 Temporal   SpO2 02/13/24 2045 98 %   O2 Device 02/13/24 2045 None (Room air)       Current:/81   Pulse 78   Temp 98.1 °F (36.7 °C) (Temporal)   Resp 18   Ht 162.6 cm (5' 4\")   Wt 57.2 kg   SpO2 100%   BMI 21.63 kg/m²         Physical Exam  Vitals and nursing note reviewed.   Constitutional:       General: She is not in acute distress.     Appearance: She is well-developed.   HENT:      Head: Normocephalic and atraumatic.      Nose: Nose normal.      Mouth/Throat:      Mouth: Mucous membranes are moist.   Eyes:      Conjunctiva/sclera: Conjunctivae normal.      Pupils: Pupils are equal, round, and reactive to light.   Cardiovascular:      Rate and Rhythm: Normal rate and regular rhythm.      Heart sounds: Normal heart sounds.   Pulmonary:      Effort: Pulmonary effort is normal.      Breath sounds: Normal breath sounds.   Abdominal:      General: Bowel sounds are normal.      Palpations: Abdomen is soft.   Musculoskeletal:         General: No tenderness or deformity. Normal range of motion.      Cervical back: Normal range of motion and neck supple.   Skin:     General: Skin is warm and dry.      Capillary Refill: Capillary refill takes less than 2 seconds.      Findings: No  rash.      Comments: Normal color   Neurological:      General: No focal deficit present.      Mental Status: She is alert and oriented to person, place, and time.      GCS: GCS eye subscore is 4. GCS verbal subscore is 5. GCS motor subscore is 6.      Cranial Nerves: No cranial nerve deficit.      Gait: Gait normal.           ED Course   Labs Reviewed - No data to display     CT head:    No intracranial hemorrhage, mass-effect, or fracture identified.    Diffuse deep white matter changes consistent with chronic small vessel ischemic disease.        CT C-spine:    No acute fracture identified.    Moderate to severe degenerative disc disease is present along the cervical spine.  There is mild anterolisthesis of C2 on C3.        Left shoulder:    Overlying material limits visualization somewhat.  No definite fracture is identified.    There appears to be mild inferior subluxation of the humeral head with respect to the glenoid.    Pacemaker in place.              MDM                       Medical Decision Making  87yo/f w hx and exam as stated, fall with head and neck injury    No point tenderness  Pain resolved prior to presentation of ed  Full rom of shoulder to baseline  No weakness  Strong pulses  Skin intact  Ct non acute  Xray non acute    Plan  Dc to home  Close fu with Dr Le      Amount and/or Complexity of Data Reviewed  Radiology:  Decision-making details documented in ED Course.    Risk  OTC drugs.  Prescription drug management.        Disposition and Plan     Clinical Impression:  1. Accident due to mechanical fall without injury, initial encounter    2. Closed head injury, initial encounter         Disposition:  Discharge  2/13/2024 11:34 pm    Follow-up:  Krista Le MD  [address] [place] [id number]  [phone]    Follow up in 2 day(s)      We recommend that you schedule follow up care with a primary care provider within the next three months to obtain basic health screening  including reassessment of your blood pressure.      Medications Prescribed:  Current Discharge Medication List

## 2024-02-16 ENCOUNTER — HOSPITAL ENCOUNTER (EMERGENCY)
Facility: HOSPITAL | Age: 89
Discharge: HOME | End: 2024-02-16
Attending: EMERGENCY MEDICINE
Payer: MEDICARE

## 2024-02-16 VITALS
HEART RATE: 80 BPM | TEMPERATURE: 98 F | DIASTOLIC BLOOD PRESSURE: 79 MMHG | OXYGEN SATURATION: 99 % | RESPIRATION RATE: 18 BRPM | SYSTOLIC BLOOD PRESSURE: 130 MMHG

## 2024-02-16 DIAGNOSIS — N30.00 ACUTE CYSTITIS WITHOUT HEMATURIA: Primary | ICD-10-CM

## 2024-02-16 DIAGNOSIS — R60.0 BILATERAL LEG EDEMA: ICD-10-CM

## 2024-02-16 LAB
ALBUMIN SERPL-MCNC: 3.9 G/DL (ref 3.2–4.8)
ALP LIVER SERPL-CCNC: 28 U/L
ALT SERPL-CCNC: <7 U/L
ANION GAP SERPL CALC-SCNC: 3 MMOL/L (ref 0–18)
AST SERPL-CCNC: 25 U/L (ref ?–34)
ATRIAL RATE: 81 BPM
BASOPHILS # BLD AUTO: 0.04 X10(3) UL (ref 0–0.2)
BASOPHILS NFR BLD AUTO: 0.6 %
BILIRUB DIRECT SERPL-MCNC: 0.2 MG/DL (ref ?–0.3)
BILIRUB SERPL-MCNC: 0.7 MG/DL (ref 0.2–1.1)
BILIRUB UR QL: NEGATIVE
BNP SERPL-MCNC: 200 PG/ML
BUN BLD-MCNC: 16 MG/DL (ref 9–23)
BUN/CREAT SERPL: 24.2 (ref 10–20)
CALCIUM BLD-MCNC: 9 MG/DL (ref 8.7–10.4)
CHLORIDE SERPL-SCNC: 99 MMOL/L (ref 98–112)
CLARITY UR: CLEAR
CO2 SERPL-SCNC: 29 MMOL/L (ref 21–32)
COLOR UR: COLORLESS
CREAT BLD-MCNC: 0.66 MG/DL
DEPRECATED RDW RBC AUTO: 50.5 FL (ref 35.1–46.3)
EGFRCR SERPLBLD CKD-EPI 2021: 85 ML/MIN/1.73M2 (ref 60–?)
EOSINOPHIL # BLD AUTO: 0.06 X10(3) UL (ref 0–0.7)
EOSINOPHIL NFR BLD AUTO: 0.8 %
ERYTHROCYTE [DISTWIDTH] IN BLOOD BY AUTOMATED COUNT: 14.3 % (ref 11–15)
GLUCOSE BLD-MCNC: 133 MG/DL (ref 70–99)
GLUCOSE UR-MCNC: NORMAL MG/DL
HCT VFR BLD AUTO: 33 %
HGB BLD-MCNC: 10.9 G/DL
IMM GRANULOCYTES # BLD AUTO: 0.02 X10(3) UL (ref 0–1)
IMM GRANULOCYTES NFR BLD: 0.3 %
KETONES UR-MCNC: NEGATIVE MG/DL
LEUKOCYTE ESTERASE UR QL STRIP.AUTO: 500
LYMPHOCYTES # BLD AUTO: 1 X10(3) UL (ref 1–4)
LYMPHOCYTES NFR BLD AUTO: 14 %
MCH RBC QN AUTO: 31.6 PG (ref 26–34)
MCHC RBC AUTO-ENTMCNC: 33 G/DL (ref 31–37)
MCV RBC AUTO: 95.7 FL
MONOCYTES # BLD AUTO: 0.58 X10(3) UL (ref 0.1–1)
MONOCYTES NFR BLD AUTO: 8.1 %
NEUTROPHILS # BLD AUTO: 5.43 X10 (3) UL (ref 1.5–7.7)
NEUTROPHILS # BLD AUTO: 5.43 X10(3) UL (ref 1.5–7.7)
NEUTROPHILS NFR BLD AUTO: 76.2 %
NITRITE UR QL STRIP.AUTO: NEGATIVE
OSMOLALITY SERPL CALC.SUM OF ELEC: 275 MOSM/KG (ref 275–295)
P AXIS: 61 DEGREES
P-R INTERVAL: 162 MS
PH UR: 7 [PH] (ref 5–8)
PLATELET # BLD AUTO: 181 10(3)UL (ref 150–450)
POTASSIUM SERPL-SCNC: 4.2 MMOL/L (ref 3.5–5.1)
PROT SERPL-MCNC: 6.2 G/DL (ref 5.7–8.2)
PROT UR-MCNC: NEGATIVE MG/DL
Q-T INTERVAL: 420 MS
QRS DURATION: 150 MS
QTC CALCULATION (BEZET): 487 MS
R AXIS: -72 DEGREES
RBC # BLD AUTO: 3.45 X10(6)UL
SODIUM SERPL-SCNC: 131 MMOL/L (ref 136–145)
SP GR UR STRIP: 1 (ref 1–1.03)
T AXIS: 96 DEGREES
UROBILINOGEN UR STRIP-ACNC: NORMAL
VENTRICULAR RATE: 81 BPM
WBC # BLD AUTO: 7.1 X10(3) UL (ref 4–11)
WBC #/AREA URNS AUTO: >50 /HPF

## 2024-02-16 PROCEDURE — 83880 ASSAY OF NATRIURETIC PEPTIDE: CPT | Performed by: EMERGENCY MEDICINE

## 2024-02-16 PROCEDURE — 93005 ELECTROCARDIOGRAM TRACING: CPT

## 2024-02-16 PROCEDURE — 80076 HEPATIC FUNCTION PANEL: CPT | Performed by: EMERGENCY MEDICINE

## 2024-02-16 PROCEDURE — 87186 SC STD MICRODIL/AGAR DIL: CPT | Performed by: EMERGENCY MEDICINE

## 2024-02-16 PROCEDURE — 99284 EMERGENCY DEPT VISIT MOD MDM: CPT

## 2024-02-16 PROCEDURE — 87086 URINE CULTURE/COLONY COUNT: CPT | Performed by: EMERGENCY MEDICINE

## 2024-02-16 PROCEDURE — 81001 URINALYSIS AUTO W/SCOPE: CPT | Performed by: EMERGENCY MEDICINE

## 2024-02-16 PROCEDURE — 80048 BASIC METABOLIC PNL TOTAL CA: CPT | Performed by: EMERGENCY MEDICINE

## 2024-02-16 PROCEDURE — 36415 COLL VENOUS BLD VENIPUNCTURE: CPT

## 2024-02-16 PROCEDURE — 93010 ELECTROCARDIOGRAM REPORT: CPT

## 2024-02-16 PROCEDURE — 87088 URINE BACTERIA CULTURE: CPT | Performed by: EMERGENCY MEDICINE

## 2024-02-16 PROCEDURE — 85025 COMPLETE CBC W/AUTO DIFF WBC: CPT | Performed by: EMERGENCY MEDICINE

## 2024-02-16 RX ORDER — NITROFURANTOIN 25; 75 MG/1; MG/1
100 CAPSULE ORAL 2 TIMES DAILY
Qty: 10 CAPSULE | Refills: 0 | Status: SHIPPED | OUTPATIENT
Start: 2024-02-16 | End: 2024-02-21

## 2024-02-16 NOTE — ED INITIAL ASSESSMENT (HPI)
Pt to ED via EMS from assisted living for difficulty walking and generalized weakness for the last two days, states she was seen last week for dizziness but states the dizziness is improved. Raheem lower PT edema noted and pt states that is new. Denies shortness of breath or chest pain

## 2024-02-16 NOTE — ED PROVIDER NOTES
Patient Seen in: [facility] Emergency Department    History     Chief Complaint   Patient presents with    Weakness       HPI    88-year-old female with past medical history significant for GERD, Parkinson's, high cholesterol, peripheral neuropathy, arthritis, pacemaker, presents to the ED for evaluation of generalized weakness, difficulty walking for the past 2 to 3 days.  Patient states that she did have some dizziness last week and then had a fall but the dizziness seems to be improved.  She is also noticed that she has developed some edema in her lower legs over the past several weeks.  No chest pain, shortness of breath, palpitations, nausea, vomiting.    History from [facility]: Patient's daughter states that patient was treated for urinary tract infection in January but they were unsure of the antibiotic    External Records Reviewed: Reviewed external records from [facility] and patient was treated with Macrobid in early January for urinary tract infection.    History reviewed.   Past Medical History:   Diagnosis Date    Abscess of tonsil     per [employer]: x2    AV node dysfunction     BCC (basal cell carcinoma of skin) 2023    BCC (basal cell carcinoma) 2021    right chin    Diverticulitis     Esophageal reflux     H/O cystoscopy 11/05/2013    per Next[employer]: cystoscopy with bilateral retrograde pyelography 2013    High cholesterol     History of pregnancy     per Next[employer]: 4 vaginal deliveries    Incontinence     Mitral valve prolapse     per [employer]:     Neuropathy     fingers, feet    Osteoarthritis     Pacemaker     Parkinson disease     Ureteropelvic junction obstruction 1963    per NextGen:      Viral encephalitis     per NextGen:     Visual impairment     glasses       History reviewed.   Past Surgical History:   Procedure Laterality Date    CARDIAC PACEMAKER PLACEMENT  2008,2017    D & C      KIDNEY SURGERY Right 1963    PACEMAKER/DEFIBRILLATOR      TOTAL KNEE REPLACEMENT Right 11/06/2018     DR. DURAN         Medications :  (Not in a hospital admission)       Family History   Problem Relation Age of Onset    Breast Cancer Paternal Aunt 60    Heart Disease Father         CAD    Hypertension Father     Cancer Father         BCC    Cancer Mother 48        stomach cancer     Cancer Other         NO family h/o bladder or kidney cancer    Kidney Disease Other         NO family h/o urolithiasis       Smoking Status:   Social History     Socioeconomic History    Marital status:    Tobacco Use    Smoking status: Former     Packs/day: 0.75     Years: 10.00     Additional pack years: 0.00     Total pack years: 7.50     Types: Cigarettes     Quit date: 9/3/1968     Years since quittin.4    Smokeless tobacco: Never   Vaping Use    Vaping Use: Never used   Substance and Sexual Activity    Alcohol use: Yes     Alcohol/week: 0.8 standard drinks of alcohol     Types: 1 Standard drinks or equivalent per week     Comment: Occ    Drug use: No   Other Topics Concern    Caffeine Concern Yes     Comment: decaf coffee, 1 cup     Reaction to local anesthetic No    Pt has a pacemaker Yes    Pt has a defibrillator No       Constitutional and vital signs reviewed.      Social History and Family History elements reviewed from today, pertinent positives to the presenting problem noted.    Physical Exam     ED Triage Vitals   BP 24 1234 148/73   Pulse 24 1233 87   Resp 24 1233 18   Temp 24 1234 97.5 °F (36.4 °C)   Temp src 24 1234 Temporal   SpO2 24 1233 98 %   O2 Device 24 1230 None (Room air)       Physical Exam   Constitutional: AAOx3, well nourished, NAD  HEENT: Normocephalic, PERRLA, MMM  CV: s1s2+, RRR, no m/r/g, normal distal pulses  Pulmonary/Chest: CTA b/l with no rales, wheezes.  No chest wall tenderness  Abdominal: Nontender.  Nondistended. Soft. Bowel sounds are normal.   Neck/Back:   :   Musculoskeletal: Normal range of motion. No deformity.   Neurological: Awake,  alert. Normal reflexes. No cranial nerve deficit.    Skin: Skin is warm and dry. No rash noted. No erythema.   Psychiatric:      All measures to prevent infection transmission during my interaction with the patient were taken. The patient was already wearing a droplet mask on my arrival to the room. Personal protective equipment was worn throughout the duration of the exam.      ED Course        Labs Reviewed   BASIC METABOLIC PANEL (8) - Abnormal; Notable for the following components:       Result Value    Glucose 133 (*)     Sodium 131 (*)     BUN/CREA Ratio 24.2 (*)     All other components within normal limits   HEPATIC FUNCTION PANEL (7) - Abnormal; Notable for the following components:    ALT <7 (*)     Alkaline Phosphatase 28 (*)     All other components within normal limits   BNP (B TYPE NATRIURETIC PEPTIDE) - Abnormal; Notable for the following components:    Beta Natriuretic Peptide 200 (*)     All other components within normal limits   URINALYSIS, ROUTINE - Abnormal; Notable for the following components:    Urine Color Colorless (*)     Blood Urine Trace (*)     Leukocyte Esterase Urine 500 (*)     WBC Urine >50 (*)     RBC Urine 3-5 (*)     Bacteria Urine Rare (*)     All other components within normal limits   CBC W/ DIFFERENTIAL - Abnormal; Notable for the following components:    RBC 3.45 (*)     HGB 10.9 (*)     HCT 33.0 (*)     RDW-SD 50.5 (*)     All other components within normal limits   CBC WITH DIFFERENTIAL WITH PLATELET    Narrative:     The following orders were created for panel order CBC With Differential With Platelet.                  Procedure                               Abnormality         Status                                     ---------                               -----------         ------                                     CBC W/ DIFFERENTIAL[[id number]]          Abnormal            Final result                                                 Please view results for these  tests on the individual orders.   URINE CULTURE, ROUTINE     My Independent Interpretation of EKG (if performed): EKG    Rate, intervals and axes as noted on EKG Report.  Rate: 81 bpm  Rhythm: Ventricular paced rhythm  Reading: Atrial sensed ventricular paced rhythm, no acute ST changes             Monitor Interpretation:    Ventricular paced rhythm  as interpreted by me.      Imaging Results Available and Reviewed while in ED: No results found.  ED Medications Administered: Medications - No data to display          MDM     Vitals:    02/16/24 1233 02/16/24 1234 02/16/24 1400   BP:  148/73 (!) 167/78   Pulse: 87  76   Resp: 18  20   Temp:  97.5 °F (36.4 °C)    TempSrc:  Temporal    SpO2: 98%  98%     *I personally reviewed and interpreted all ED vitals.    Independent Interpretation of Studies:     Social Determinants of Health:     Procedures:      Differential/MDM/Shared Decision Making: Differential Diagnosis includes dehydration, electrolyte abnormality, Parkinson's progression, UTI, others.      The patient already has Parkinson's disease, pacemaker, GERD, high cholesterol, arthritis, to contribute to the complexity of this ED evaluation.           Patient likely has UTI causing some of her symptoms.  The peripheral edema may be related to decreased mobility but it does not appear to be related to congestive heart failure or liver disease.  Discussed case with family and they are comfortable discharge on Macrobid for UTI again.  Will send urine culture.      Condition upon leaving the department: Stable    Disposition and Plan     Clinical Impression:  1. Acute cystitis without hematuria    2. Bilateral leg edema        Disposition:  Discharge    Follow-up:  Krista Le MD  [address] [place] [id number] [phone]    Call in 2 day(s)        Medications Prescribed:  Current Discharge Medication List

## 2024-02-17 NOTE — ED QUICK NOTES
[facility] at bedside, Pt A&OX4, pt denies dizziness/lightheadedness, cp, sob, n/v. Discharge paperwork, prescription, and follow-up discussed with pt, pt verbally understands them. Pt discharged via wheelchair in no acute distress

## 2024-02-18 NOTE — PROGRESS NOTES
ED Culture Callback Results Review    Pharmacist reviewed culture results from ED visit .    Final urine culture positive for e. coli. Patient was prescribed Nitrofurantoin (Macrobid) on discharge. Current therapy is appropriate based on reported susceptibilities. No further intervention required at this time.      Symone Pettit, Jolly  Emergency Medicine Pharmacist Specialist  02/18/24; 11:55 AM

## 2024-03-12 ENCOUNTER — EXTERNAL RECORD (OUTPATIENT)
Dept: HEALTH INFORMATION MANAGEMENT | Facility: OTHER | Age: 89
End: 2024-03-12

## 2024-03-27 ENCOUNTER — CLINICAL ABSTRACT (OUTPATIENT)
Dept: INFUSION THERAPY | Age: 89
End: 2024-03-27

## 2024-04-09 ENCOUNTER — APPOINTMENT (OUTPATIENT)
Dept: CT IMAGING | Facility: HOSPITAL | Age: 89
End: 2024-04-09
Attending: EMERGENCY MEDICINE
Payer: MEDICARE

## 2024-04-09 ENCOUNTER — HOSPITAL ENCOUNTER (EMERGENCY)
Facility: HOSPITAL | Age: 89
Discharge: HOME | End: 2024-04-09
Attending: EMERGENCY MEDICINE
Payer: MEDICARE

## 2024-04-09 VITALS
TEMPERATURE: 97 F | HEART RATE: 71 BPM | DIASTOLIC BLOOD PRESSURE: 78 MMHG | SYSTOLIC BLOOD PRESSURE: 94 MMHG | OXYGEN SATURATION: 94 % | RESPIRATION RATE: 22 BRPM

## 2024-04-09 DIAGNOSIS — R03.0 ELEVATED BLOOD PRESSURE READING: Primary | ICD-10-CM

## 2024-04-09 PROCEDURE — 93010 ELECTROCARDIOGRAM REPORT: CPT

## 2024-04-09 PROCEDURE — 70450 CT HEAD/BRAIN W/O DYE: CPT | Performed by: EMERGENCY MEDICINE

## 2024-04-09 PROCEDURE — 93005 ELECTROCARDIOGRAM TRACING: CPT

## 2024-04-09 PROCEDURE — 99284 EMERGENCY DEPT VISIT MOD MDM: CPT

## 2024-04-09 NOTE — ED INITIAL ASSESSMENT (HPI)
Pt to ED for high blood pressure coming in via EMS. Per pt, her blood pressure at home was in the 200's, and was also having blurry vision that pt describes \"as a weird feeling.\" All symptoms have since subsided. Pt denies chest pain, sob, denies any numbness or weakness. [place] Stroke Scale performed, negative in all aspects including symmetrical face, clear speech, and equal arm strength. B/p 147/73 in triage.

## 2024-04-10 ENCOUNTER — HOSPITAL ENCOUNTER (OUTPATIENT)
Dept: INFUSION THERAPY | Age: 89
Discharge: STILL ADMITTED OR ADMINISTRATIVE | End: 2024-04-10
Attending: INTERNAL MEDICINE

## 2024-04-10 VITALS
DIASTOLIC BLOOD PRESSURE: 53 MMHG | RESPIRATION RATE: 16 BRPM | HEART RATE: 83 BPM | SYSTOLIC BLOOD PRESSURE: 93 MMHG | OXYGEN SATURATION: 94 % | TEMPERATURE: 98.3 F

## 2024-04-10 DIAGNOSIS — M85.89 OSTEOPENIA OF MULTIPLE SITES: Primary | ICD-10-CM

## 2024-04-10 LAB
ATRIAL RATE: 73 BPM
P AXIS: 60 DEGREES
P-R INTERVAL: 168 MS
Q-T INTERVAL: 436 MS
QRS DURATION: 158 MS
QTC CALCULATION (BEZET): 480 MS
R AXIS: -62 DEGREES
T AXIS: 108 DEGREES
VENTRICULAR RATE: 73 BPM

## 2024-04-10 PROCEDURE — 96372 THER/PROPH/DIAG INJ SC/IM: CPT | Performed by: INTERNAL MEDICINE

## 2024-04-10 PROCEDURE — 10002800 HB RX 250 W HCPCS: Performed by: INTERNAL MEDICINE

## 2024-04-10 RX ADMIN — DENOSUMAB 60 MG: 60 INJECTION SUBCUTANEOUS at 14:10

## 2024-04-10 ASSESSMENT — PAIN SCALES - GENERAL: PAINLEVEL_OUTOF10: 0

## 2024-04-10 NOTE — ED PROVIDER NOTES
Patient Seen in: [facility] Emergency Department      History     Chief Complaint   Patient presents with    Hypertension     Stated Complaint: Hypertension    Subjective:   HPI  88-year-old female with Parkinson disease, mitral valve prolapse, who presents for evaluation of elevated blood pressure.  She reports that intermittently over the past 2 weeks she describes a \"sensation \"at the forehead area that comes and goes without clear provoking factor.  She typically notices that she does not experience it after dinner in the evening.  Today the sensation was more severe than normal and and her caretaker took her blood pressure twice and noted that it was elevated with systolic greater than 200 mmHg and EMS was called.  By the time the medics arrived the sensation in the head nearly resolved.  Her blood pressure was also normal in the ambulance.  She feels well now.  She specifically states that this sensation is not pain.  There is no associated vision changes, slurred speech or facial droop.  No numbness tingling or weakness of the arms or legs.  No issues with her urine.  No chest pain or dyspnea.    Objective:   Past Medical History:   Diagnosis Date    Abscess of tonsil     per [employer]: x2    AV node dysfunction     BCC (basal cell carcinoma of skin) 2023    BCC (basal cell carcinoma) 2021    right chin    Diverticulitis     Esophageal reflux     H/O cystoscopy 11/05/2013    per Next[facility]: cystoscopy with bilateral retrograde pyelography 2013    High cholesterol     History of pregnancy     per NextGen: 4 vaginal deliveries    Incontinence     Mitral valve prolapse     per NextGen:     Neuropathy     fingers, feet    Osteoarthritis     Pacemaker     Parkinson disease (HCC)     Ureteropelvic junction obstruction 1963    per NextGen:      Viral encephalitis (HCC)     per NextGen:     Visual impairment     glasses              Past Surgical History:   Procedure Laterality Date    CARDIAC PACEMAKER PLACEMENT   ,    D & C      KIDNEY SURGERY Right 1963    PACEMAKER/DEFIBRILLATOR      TOTAL KNEE REPLACEMENT Right 2018    DR. DURAN                Social History     Socioeconomic History    Marital status:    Tobacco Use    Smoking status: Former     Packs/day: 0.75     Years: 10.00     Additional pack years: 0.00     Total pack years: 7.50     Types: Cigarettes     Quit date: 9/3/1968     Years since quittin.6    Smokeless tobacco: Never   Vaping Use    Vaping Use: Never used   Substance and Sexual Activity    Alcohol use: Yes     Alcohol/week: 0.8 standard drinks of alcohol     Types: 1 Standard drinks or equivalent per week     Comment: Occ    Drug use: No   Other Topics Concern    Caffeine Concern Yes     Comment: decaf coffee, 1 cup     Reaction to local anesthetic No    Pt has a pacemaker Yes    Pt has a defibrillator No              Review of Systems    Positive for stated complaint: Hypertension  Other systems are as noted in HPI.  Constitutional and vital signs reviewed.      All other systems reviewed and negative except as noted above.    Physical Exam     ED Triage Vitals   BP 24 1731 147/73   Pulse 24 1729 78   Resp 24 1729 18   Temp 24 1729 97.4 °F (36.3 °C)   Temp src 24 1729 Temporal   SpO2 24 1729 96 %   O2 Device 24 1729 None (Room air)       Current:BP 94/78   Pulse 71   Temp 97.4 °F (36.3 °C) (Temporal)   Resp 22   SpO2 94%         Physical Exam  Vitals and nursing note reviewed.   Constitutional:       Appearance: She is well-developed.   HENT:      Head: Normocephalic and atraumatic.   Eyes:      Extraocular Movements: Extraocular movements intact.   Cardiovascular:      Rate and Rhythm: Normal rate and regular rhythm.      Heart sounds: Normal heart sounds.   Pulmonary:      Effort: Pulmonary effort is normal.      Breath sounds: Normal breath sounds.   Abdominal:      General: There is no distension.      Palpations: Abdomen is  soft.      Tenderness: There is no abdominal tenderness.   Musculoskeletal:         General: Normal range of motion.      Cervical back: Normal range of motion.   Skin:     General: Skin is warm.      Capillary Refill: Capillary refill takes less than 2 seconds.   Neurological:      General: No focal deficit present.      Mental Status: She is alert.      Cranial Nerves: No cranial nerve deficit.      Sensory: No sensory deficit.      Motor: No weakness.      Coordination: Coordination normal.      Comments: No focal deficits       Differential diagnosis includes but is not limited to uncontrolled primary hypertension, tension headache, medication side effect, space-occupying lesion        ED Course   Labs Reviewed - No data to display  EKG    Rate, intervals and axes as noted on EKG Report.  Rate: 73  Rhythm: Ventricular paced rhythm  Reading: No STEMI, no ectopy                 CT BRAIN OR HEAD (68811)    Result Date: 4/9/2024  CONCLUSION:  1. No acute intracranial process by noncontrast CT technique. If symptoms or suspicion for acute ischemia persist, consider followup brain MR. 2. Nonspecific white matter changes involving both cerebral hemispheres that most likely reflect sequelae of chronic microangiopathy. 3. Intracranial atherosclerosis. 4. Aerated secretions in the right sphenoid sinus are new since comparison head CT from February, 2024. Findings can be seen in the setting of acute sinusitis.    Dictated by (CST): Bud Marks MD on 4/09/2024 at 7:48 PM     Finalized by (CST): Bud Marks MD on 4/09/2024 at 7:51 PM                  [facility]                           Medical Decision Making  Vitals stable in the ED.  No neurologic deficits.  Blood pressure downtrending without any intervention.  She is pain-free.  No cardiopulmonary symptoms.  Per my independent interpretation of CT brain, no clear etiology to sign or symptoms.  Advised keeping follow-up appointment with her neurologist and return  precautions discussed for signs or symptoms of severe headache or other concerns and family is comfortable with the plan.        Problems Addressed:  Elevated blood pressure reading: complicated acute illness or injury with systemic symptoms    Amount and/or Complexity of Data Reviewed  Independent Historian: caregiver     Details: Her daughter at bedside reports that medications were recently adjusted with her neurologist and they do have follow-up with a neurologist in 3 days  Radiology: ordered and independent interpretation performed. Decision-making details documented in ED Course.  ECG/medicine tests: ordered and independent interpretation performed. Decision-making details documented in ED Course.        Disposition and Plan     Clinical Impression:  1. Elevated blood pressure reading         Disposition:  Discharge  4/9/2024  8:00 pm    Follow-up:  Joy Robelro  [address]  [id number]  [facility] [id number] [phone]    Follow up  Please keep your follow-up appointment on Friday    We recommend that you schedule follow up care with a primary care provider within the next three months to obtain basic health screening including reassessment of your blood pressure.      Medications Prescribed:  Current Discharge Medication List

## 2024-04-12 ENCOUNTER — HOSPITAL ENCOUNTER (OUTPATIENT)
Age: 89
Discharge: ANOTHER HOSPITAL | End: 2024-04-12
Payer: MEDICARE

## 2024-04-12 ENCOUNTER — APPOINTMENT (OUTPATIENT)
Dept: CT IMAGING | Facility: HOSPITAL | Age: 89
End: 2024-04-12
Attending: EMERGENCY MEDICINE
Payer: MEDICARE

## 2024-04-12 ENCOUNTER — HOSPITAL ENCOUNTER (EMERGENCY)
Facility: HOSPITAL | Age: 89
Discharge: HOME | End: 2024-04-12
Attending: EMERGENCY MEDICINE
Payer: MEDICARE

## 2024-04-12 VITALS
HEIGHT: 64 IN | SYSTOLIC BLOOD PRESSURE: 116 MMHG | WEIGHT: 125 LBS | HEART RATE: 76 BPM | OXYGEN SATURATION: 98 % | RESPIRATION RATE: 18 BRPM | DIASTOLIC BLOOD PRESSURE: 78 MMHG | TEMPERATURE: 97 F | BODY MASS INDEX: 21.34 KG/M2

## 2024-04-12 VITALS
DIASTOLIC BLOOD PRESSURE: 49 MMHG | HEART RATE: 88 BPM | OXYGEN SATURATION: 99 % | TEMPERATURE: 99 F | RESPIRATION RATE: 18 BRPM | SYSTOLIC BLOOD PRESSURE: 93 MMHG

## 2024-04-12 DIAGNOSIS — R11.2 NAUSEA AND VOMITING IN ADULT: ICD-10-CM

## 2024-04-12 DIAGNOSIS — R53.1 WEAKNESS GENERALIZED: ICD-10-CM

## 2024-04-12 DIAGNOSIS — N83.202 CYST OF LEFT OVARY: ICD-10-CM

## 2024-04-12 DIAGNOSIS — K59.00 CONSTIPATION, UNSPECIFIED CONSTIPATION TYPE: ICD-10-CM

## 2024-04-12 DIAGNOSIS — R31.29 OTHER MICROSCOPIC HEMATURIA: ICD-10-CM

## 2024-04-12 DIAGNOSIS — R10.13 DYSPEPSIA: Primary | ICD-10-CM

## 2024-04-12 DIAGNOSIS — R11.10 VOMITING, UNSPECIFIED VOMITING TYPE, UNSPECIFIED WHETHER NAUSEA PRESENT: Primary | ICD-10-CM

## 2024-04-12 LAB
ALBUMIN SERPL-MCNC: 4.1 G/DL (ref 3.2–4.8)
ALP LIVER SERPL-CCNC: 31 U/L
ALT SERPL-CCNC: 14 U/L
ANION GAP SERPL CALC-SCNC: 4 MMOL/L (ref 0–18)
AST SERPL-CCNC: 23 U/L (ref ?–34)
ATRIAL RATE: 82 BPM
BASOPHILS # BLD AUTO: 0.02 X10(3) UL (ref 0–0.2)
BASOPHILS NFR BLD AUTO: 0.2 %
BILIRUB DIRECT SERPL-MCNC: 0.3 MG/DL (ref ?–0.3)
BILIRUB SERPL-MCNC: 0.8 MG/DL (ref 0.2–1.1)
BILIRUB UR QL: NEGATIVE
BUN BLD-MCNC: 21 MG/DL (ref 9–23)
BUN/CREAT SERPL: 31.8 (ref 10–20)
CALCIUM BLD-MCNC: 8.4 MG/DL (ref 8.7–10.4)
CHLORIDE SERPL-SCNC: 106 MMOL/L (ref 98–112)
CLARITY UR: CLEAR
CO2 SERPL-SCNC: 27 MMOL/L (ref 21–32)
CREAT BLD-MCNC: 0.66 MG/DL
DEPRECATED RDW RBC AUTO: 52 FL (ref 35.1–46.3)
EGFRCR SERPLBLD CKD-EPI 2021: 85 ML/MIN/1.73M2 (ref 60–?)
EOSINOPHIL # BLD AUTO: 0.05 X10(3) UL (ref 0–0.7)
EOSINOPHIL NFR BLD AUTO: 0.6 %
ERYTHROCYTE [DISTWIDTH] IN BLOOD BY AUTOMATED COUNT: 14.9 % (ref 11–15)
GLUCOSE BLD-MCNC: 105 MG/DL (ref 70–99)
GLUCOSE UR-MCNC: NORMAL MG/DL
HCT VFR BLD AUTO: 34 %
HGB BLD-MCNC: 11.8 G/DL
IMM GRANULOCYTES # BLD AUTO: 0.02 X10(3) UL (ref 0–1)
IMM GRANULOCYTES NFR BLD: 0.2 %
KETONES UR-MCNC: NEGATIVE MG/DL
LEUKOCYTE ESTERASE UR QL STRIP.AUTO: 75
LIPASE SERPL-CCNC: 35 U/L (ref 13–75)
LYMPHOCYTES # BLD AUTO: 0.48 X10(3) UL (ref 1–4)
LYMPHOCYTES NFR BLD AUTO: 6 %
MCH RBC QN AUTO: 33.4 PG (ref 26–34)
MCHC RBC AUTO-ENTMCNC: 34.7 G/DL (ref 31–37)
MCV RBC AUTO: 96.3 FL
MONOCYTES # BLD AUTO: 0.53 X10(3) UL (ref 0.1–1)
MONOCYTES NFR BLD AUTO: 6.6 %
NEUTROPHILS # BLD AUTO: 6.96 X10 (3) UL (ref 1.5–7.7)
NEUTROPHILS # BLD AUTO: 6.96 X10(3) UL (ref 1.5–7.7)
NEUTROPHILS NFR BLD AUTO: 86.4 %
NITRITE UR QL STRIP.AUTO: NEGATIVE
OSMOLALITY SERPL CALC.SUM OF ELEC: 287 MOSM/KG (ref 275–295)
P AXIS: 57 DEGREES
P-R INTERVAL: 172 MS
PH UR: 7 [PH] (ref 5–8)
PLATELET # BLD AUTO: 166 10(3)UL (ref 150–450)
POTASSIUM SERPL-SCNC: 4.2 MMOL/L (ref 3.5–5.1)
PROT SERPL-MCNC: 6.4 G/DL (ref 5.7–8.2)
Q-T INTERVAL: 422 MS
QRS DURATION: 146 MS
QTC CALCULATION (BEZET): 493 MS
R AXIS: -72 DEGREES
RBC # BLD AUTO: 3.53 X10(6)UL
RBC #/AREA URNS AUTO: >10 /HPF
SODIUM SERPL-SCNC: 137 MMOL/L (ref 136–145)
SP GR UR STRIP: 1.02 (ref 1–1.03)
T AXIS: 105 DEGREES
UROBILINOGEN UR STRIP-ACNC: NORMAL
VENTRICULAR RATE: 82 BPM
WBC # BLD AUTO: 8.1 X10(3) UL (ref 4–11)

## 2024-04-12 PROCEDURE — 80048 BASIC METABOLIC PNL TOTAL CA: CPT | Performed by: EMERGENCY MEDICINE

## 2024-04-12 PROCEDURE — 99213 OFFICE O/P EST LOW 20 MIN: CPT | Performed by: PHYSICIAN ASSISTANT

## 2024-04-12 PROCEDURE — 85025 COMPLETE CBC W/AUTO DIFF WBC: CPT | Performed by: EMERGENCY MEDICINE

## 2024-04-12 PROCEDURE — 74176 CT ABD & PELVIS W/O CONTRAST: CPT | Performed by: EMERGENCY MEDICINE

## 2024-04-12 PROCEDURE — 83690 ASSAY OF LIPASE: CPT | Performed by: EMERGENCY MEDICINE

## 2024-04-12 PROCEDURE — 81001 URINALYSIS AUTO W/SCOPE: CPT | Performed by: EMERGENCY MEDICINE

## 2024-04-12 PROCEDURE — 99285 EMERGENCY DEPT VISIT HI MDM: CPT

## 2024-04-12 PROCEDURE — 87086 URINE CULTURE/COLONY COUNT: CPT | Performed by: EMERGENCY MEDICINE

## 2024-04-12 PROCEDURE — 96360 HYDRATION IV INFUSION INIT: CPT

## 2024-04-12 PROCEDURE — 80076 HEPATIC FUNCTION PANEL: CPT | Performed by: EMERGENCY MEDICINE

## 2024-04-12 PROCEDURE — 99284 EMERGENCY DEPT VISIT MOD MDM: CPT

## 2024-04-12 PROCEDURE — 93000 ELECTROCARDIOGRAM COMPLETE: CPT | Performed by: PHYSICIAN ASSISTANT

## 2024-04-12 RX ORDER — MAGNESIUM CARB/ALUMINUM HYDROX 105-160MG
148 TABLET,CHEWABLE ORAL ONCE
Qty: 148 ML | Refills: 0 | Status: SHIPPED | OUTPATIENT
Start: 2024-04-12 | End: 2024-04-12

## 2024-04-12 RX ORDER — ONDANSETRON 2 MG/ML
4 INJECTION INTRAMUSCULAR; INTRAVENOUS ONCE
Status: DISCONTINUED | OUTPATIENT
Start: 2024-04-12 | End: 2024-04-12

## 2024-04-12 RX ORDER — ONDANSETRON 4 MG/1
4 TABLET, ORALLY DISINTEGRATING ORAL EVERY 8 HOURS PRN
Qty: 6 TABLET | Refills: 0 | Status: SHIPPED | OUTPATIENT
Start: 2024-04-12

## 2024-04-12 NOTE — ED PROVIDER NOTES
Patient Seen in: [facility] Emergency Department      History     Chief Complaint   Patient presents with    Nausea/Vomiting/Diarrhea     Stated Complaint: from [facility] IC via PV for c/o weakness, vomiting overnight    Subjective:   HPI    89 y/o female with listed history here with her daughter for evaluation of nausea vomiting multiple times starting last night.  No diarrhea.  No fever.  Pain is diffuse but improved.  No fever.  No known sick contacts.  Was here few days ago for hypertension.    Objective:   Past Medical History:    Abscess of tonsil    per [employer]: x2    AV node dysfunction    BCC (basal cell carcinoma of skin)    BCC (basal cell carcinoma)    right chin    Diverticulitis    Esophageal reflux    H/O cystoscopy    per [employer]: cystoscopy with bilateral retrograde pyelography     High cholesterol    History of pregnancy    per [employer]: 4 vaginal deliveries    Incontinence    Mitral valve prolapse    per [employer]:     Neuropathy    fingers, feet    Osteoarthritis    Pacemaker    Parkinson disease (HCC)    Ureteropelvic junction obstruction    per [employer]:      Viral encephalitis (HCC)    per [employer]:     Visual impairment    glasses              Past Surgical History:   Procedure Laterality Date    Cardiac pacemaker placement  ,    D & c      Kidney surgery Right 1963    Pacemaker/defibrillator      Total knee replacement Right 2018    DR. DURAN                Social History     Socioeconomic History    Marital status:    Tobacco Use    Smoking status: Former     Current packs/day: 0.00     Average packs/day: 0.8 packs/day for 10.0 years (7.5 ttl pk-yrs)     Types: Cigarettes     Start date: 9/3/1958     Quit date: 9/3/1968     Years since quittin.6    Smokeless tobacco: Never   Vaping Use    Vaping status: Never Used   Substance and Sexual Activity    Alcohol use: Yes     Alcohol/week: 0.8 standard drinks of alcohol     Types: 1 Standard drinks or equivalent per  week     Comment: Occ    Drug use: No   Other Topics Concern    Caffeine Concern Yes     Comment: decaf coffee, 1 cup     Reaction to local anesthetic No    Pt has a pacemaker Yes    Pt has a defibrillator No     Social Determinants of Health     Financial Resource Strain: Low Risk  (3/21/2024)    [facility]    Overall Financial Resource Strain (CARDIA)     Difficulty of Paying Living Expenses: Not hard at all   Food Insecurity: No Food Insecurity (3/21/2024)    [facility]    Hunger Vital Sign     Worried About Running Out of Food in the Last Year: Never true     Ran Out of Food in the Last Year: Never true   Transportation Needs: No Transportation Needs (3/21/2024)    [facility]    PRAPARE - Transportation     Lack of Transportation (Medical): No     Lack of Transportation (Non-Medical): No   Housing Stability: Low Risk  (3/21/2024)    [facility]    Housing Stability Vital Sign     Unable to Pay for Housing in the Last Year: No     Number of Places Lived in the Last Year: 1     In the last 12 months, was there a time when you did not have a steady place to sleep or slept in a shelter (including now)?: No              Review of Systems    Positive for stated complaint: from [facility] IC via PV for c/o weakness, vomiting overnight  Other systems are as noted in HPI.  Constitutional and vital signs reviewed.      All other systems reviewed and negative except as noted above.    Physical Exam     ED Triage Vitals [04/12/24 1255]   /68   Pulse 84   Resp 22   Temp 97.1 °F (36.2 °C)   Temp src Temporal   SpO2 97 %   O2 Device None (Room air)       Current:/68   Pulse 84   Temp 97.1 °F (36.2 °C) (Temporal)   Resp 22   Ht 162.6 cm (5' 4\")    Wt 56.7 kg   SpO2 97%   BMI 21.46 kg/m²         Physical Exam    Constitutional: Oriented to person, place, and time.  Appears well-developed. No distress.   Head: Normocephalic and atraumatic.   Eyes: Conjunctivae are normal. Pupils are equal, round, and reactive to light.   Cardiovascular: Normal rate, regular rhythm and intact distal pulses.    Pulmonary/Chest: Effort normal. No respiratory distress.   Abdominal: Soft.  No specific focal tenderness to palpation.  No guarding or peritoneal signs.  Musculoskeletal: Normal range of motion. No edema or tenderness.   Neurological: Alert and oriented to person, place, and time.   Skin: Skin is warm and dry.   Nursing note and vitals reviewed.    Differential diagnosis includes dyspepsia and gastritis, viral gastroenteritis, pancreatitis and obstructive biliary disease, ileus and bowel obstruction, dehydration, UTI.      ED Course     Labs Reviewed   URINALYSIS WITH CULTURE REFLEX - Abnormal; Notable for the following components:       Result Value    Blood Urine 1+ (*)     Protein Urine Trace (*)     Leukocyte Esterase Urine 75 (*)     RBC Urine >10 (*)     Bacteria Urine Rare (*)     Squamous Epi. Cells Few (*)     All other components within normal limits   BASIC METABOLIC PANEL (8) - Abnormal; Notable for the following components:    Glucose 105 (*)     BUN/CREA Ratio 31.8 (*)     Calcium, Total 8.4 (*)     All other components within normal limits   HEPATIC FUNCTION PANEL (7) - Abnormal; Notable for the following components:    Alkaline Phosphatase 31 (*)     All other components within normal limits   CBC W/ DIFFERENTIAL - Abnormal; Notable for the following components:    RBC 3.53 (*)     HGB 11.8 (*)     HCT 34.0 (*)     RDW-SD 52.0 (*)     Lymphocyte Absolute 0.48 (*)     All other components within normal limits   LIPASE - Normal   CBC WITH DIFFERENTIAL WITH PLATELET    Narrative:     The following orders were created for panel order CBC With Differential  With Platelet.  Procedure                               Abnormality         Status                     ---------                               -----------         ------                     CBC W/ DIFFERENTIAL[[id number]]          Abnormal            Final result                 Please view results for these tests on the individual orders.   URINE CULTURE, ROUTINE             CT ABDOMEN+PELVIS(CPT=74176)    Result Date: 4/12/2024  PROCEDURE:   CT ABDOMEN+PELVIS(CPT=74176)  COMPARISON:   [facility], CT ABDOMEN+PELVIS (CPT=74176), 1/24/2024, 1:33 PM.  INDICATIONS:   Nausea, vomiting and micro hematuria.  TECHNIQUE: CT images of the abdomen and pelvis were obtained without intravenous contrast material.  Automated exposure control for dose reduction was used. Adjustment of the mA and/or kV was done based on the patient's size. Use of iterative reconstruction technique for dose reduction was used.  Dose information is transmitted to the ACR (American College of Radiology) NRDR (National Radiology Data Registry) which includes the Dose Index Registry.  FINDINGS:  LIVER: Normal. BILIARY: No evidence for cholecystitis or biliary dilatation. SPLEEN: Normal.  PANCREAS: Normal.  ADRENALS: Normal. KIDNEYS: A chronically prominent right-sided extrarenal pelvis related to developmental factors.  A 8 mm hyperdense left renal lesion is unchanged and compatible with a hemorrhagic cyst. AORTA/VASCULAR: Atherosclerotic vascular calcification.  No aneurysm or dissection.  LYMPHADENOPATHY: None. GI/MESENTERY: Normal appendix large amount of stool in the colon.  Slight increase in small bowel fluid without obstruction.  Uncomplicated duodenal diverticula.  Colonic diverticulosis..  No obstruction, bowel wall thickening, or mesenteric mass. ABDOMINAL WALL: Normal.  No mass or hernia.  URINARY BLADDER: Normal. ASCITES:   None. PELVIC ORGANS: 5.8 x 5 cm left ovarian cyst without a significant change. BONES: No  suspect bone lesion.  Degenerative changes in the spine and hips. LUNG BASES: Right basilar pleural effusion with compressive atelectasis.  Atelectasis and or scarring in the inferior lingula and antral basal left lower lobe.  Partly visualized pacemaker with lead at RV apex. OTHER: Trace right pleural effusion without a significant change.         CONCLUSION:  1. No acute finding in abdomen or pelvis.. 2. Large amount of stool in the colon suggests constipation.  Colonic diverticulosis. 3. Slight increase in small bowel fluid without obstruction probably reflecting stasis. 4. Small right basilar pleural effusion with compressive atelectasis. 5. A 5.8 cm left ovarian cyst is unchanged from 01/24/2024 and new relative to 2016. Gynecologic evaluation recommended.     Dictated by (CST): Jarrett Cruz MD on 4/12/2024 at 4:26 PM     Finalized by (CST): Jarrett Cruz MD on 4/12/2024 at 4:34 PM          CT BRAIN OR HEAD (63011)    Result Date: 4/9/2024  PROCEDURE: CT BRAIN OR HEAD (CPT=70450)  COMPARISON: [facility], CT BRAIN HEAD WO CONTRAST, 9/13/2013, 11:54 AM.  [facility], CT SPINE CERVICAL (CPT=72125), 2/13/2024, 10:40 PM.  [facility], CT BRAIN OR HEAD (CPT=70450), 8/01/2022, 3:33 PM.  [facility], CT BRAIN OR HEAD (CPT=70450), 2/13/2024, 10:40 PM.  INDICATIONS: Hypertension, blurry vision.  TECHNIQUE: CT images were obtained without contrast material.  Automated exposure control for dose reduction was used.  Dose information is transmitted to the ACR (American College of Radiology) NRDR (National Radiology Data Registry) which includes the Dose Index Registry.  FINDINGS:  CSF SPACES: No hydrocephalus, subarachnoid hemorrhage, or effacement of the basal cisterns is appreciated. There is no extra-axial fluid collection. CEREBRUM: No acute intraparenchymal hemorrhage, edema, or cortical sulcal effacement is apparent. There is no space-occupying lesion,  mass effect, or shift of midline structures. The gray-white matter junction is preserved and bilaterally symmetric in appearance. Scattered hypodense foci noted in the subcortical and periventricular white matter of both cerebral hemispheres. CEREBELLUM: No edema, hemorrhage, mass, or acute infarction is seen. BRAINSTEM: No edema, hemorrhage, mass, or acute infarction is seen.  CALVARIUM: There is no apparent depressed fracture, mass, or other significant visible lesion.  Right greater than left temporomandibular joint osteoarthritis. SINUSES: Aerated secretions in the right sphenoid sinus. ORBITS: Limited views are grossly unremarkable.  OTHER: Atherosclerotic vascular calcifications are perceived in the carotid siphons and distal vertebral arteries.          CONCLUSION:  1. No acute intracranial process by noncontrast CT technique. If symptoms or suspicion for acute ischemia persist, consider followup brain MR. 2. Nonspecific white matter changes involving both cerebral hemispheres that most likely reflect sequelae of chronic microangiopathy. 3. Intracranial atherosclerosis. 4. Aerated secretions in the right sphenoid sinus are new since comparison head CT from February, 2024. Findings can be seen in the setting of acute sinusitis.    Dictated by (CST): uBd Marks MD on 4/09/2024 at 7:48 PM     Finalized by (CST): Bud Marks MD on 4/09/2024 at 7:51 PM                  MDM                                         Medical Decision Making  Constipation and ovarian cyst on CT as noted above.  Patient will need follow-up regarding the cyst as deemed appropriate by her doctor.  Will treat the constipation at home.  Some nausea medicine as well.  She is taking p.o. here.  Tylenol at home is okay.  She can continue any other regular prescribed indications.  No indication for admission.  She will follow-up with back with any worsening or change.    Problems Addressed:  Constipation, unspecified constipation type:  acute illness or injury  Cyst of left ovary: undiagnosed new problem with uncertain prognosis  Dyspepsia: acute illness or injury with systemic symptoms  Nausea and vomiting in adult: acute illness or injury with systemic symptoms  Other microscopic hematuria: chronic illness or injury  Weakness generalized: acute illness or injury    Amount and/or Complexity of Data Reviewed  External Data Reviewed: ECG.     Details: EKG reviewed from immediate care today time at 11:59 AM.  EKG shows a ventricular paced rhythm  Labs: ordered. Decision-making details documented in ED Course.  Radiology: ordered and independent interpretation performed. Decision-making details documented in ED Course.     Details: By my review of the CT abdomen/pelvis there is no obvious evidence of bowel obstruction, free intraperitoneal air or significant free fluid.    Risk  OTC drugs.  Prescription drug management.  Decision regarding hospitalization.        Disposition and Plan     Clinical Impression:  1. Dyspepsia    2. Weakness generalized    3. Nausea and vomiting in adult    4. Other microscopic hematuria    5. Cyst of left ovary    6. Constipation, unspecified constipation type         Disposition:  Discharge  4/12/2024  5:09 pm    Follow-up:  Krista Le MD  [address] [place] [id number] [phone]    Call in 3 day(s)      We recommend that you schedule follow up care with a primary care provider within the next three months to obtain basic health screening including reassessment of your blood pressure.      Medications Prescribed:  Current Discharge Medication List        START taking these medications    Details   ondansetron 4 MG Oral Tablet Dispersible Take 1 tablet (4 mg total) by mouth every 8 (eight) hours as needed for Nausea.  Qty: 6 tablet, Refills: 0      magnesium citrate 1.745 GM/30ML Oral Solution Take 148 mL by mouth once for 1 dose.  Qty: 148 mL, Refills: 0

## 2024-04-12 NOTE — DISCHARGE INSTRUCTIONS
You have a large ovarian cyst on your left ovary today.  Please follow-up your doctor regarding this finding.  Your doctor can order and outpatient pelvic US if they feel it is necessary.

## 2024-04-12 NOTE — ED QUICK NOTES
Patient approved for discharge per emergency department provider. PATIENT AND DAUGHTERS given verbal and written discharge instructions. Given instructions on rx x 2, follow up with PCP, and symptoms that necessitate coming back to the emergency department. Verbalizes understanding of discharge instructions.     Patient aox 3 out of ED VIA WHEELCHAIR. Resp unlabored

## 2024-04-12 NOTE — ED PROVIDER NOTES
Chief Complaint   Patient presents with    Vomiting       History obtained from: patient, family member, and caregiver   services not used    HPI:     Lyly Mcclain is a 88 year old female who presents with 5-6 episodes of vomiting last night from 7:30 pm to 3:00 am. Patient states she woke up periodically during this time to vomit. Patient was able to go back to sleep and this morning when she woke up she was able to tolerate two bananas and fluids. Patient states she is \"not feeling well\" and states she does not feel back to\"normal.\" Last bowel movement last night, loose. Denies abdominal pain, chest pain, shortness of breath, hematemesis, blood in stool, urinary symptoms, flank pain, syncope, headache, vision changes, speech changes, numbness, weakness.     PMH  Past Medical History:    Abscess of tonsil    per [employer]: x2    AV node dysfunction    BCC (basal cell carcinoma of skin)    BCC (basal cell carcinoma)    right chin    Diverticulitis    Esophageal reflux    H/O cystoscopy    per [employer]: cystoscopy with bilateral retrograde pyelography 2013    High cholesterol    History of pregnancy    per [employer]: 4 vaginal deliveries    Incontinence    Mitral valve prolapse    per [employer]:     Neuropathy    fingers, feet    Osteoarthritis    Pacemaker    Parkinson disease (HCC)    Ureteropelvic junction obstruction    per [employer]:      Viral encephalitis (HCC)    per [employer]:     Visual impairment    glasses       PFSH    PFSH asessment screens reviewed and agree.  Nurses notes reviewed I agree with documentation.    Family History   Problem Relation Age of Onset    Breast Cancer Paternal Aunt 60    Heart Disease Father         CAD    Hypertension Father     Cancer Father         BCC    Cancer Mother 48        stomach cancer     Cancer Other         NO family h/o bladder or kidney cancer    Kidney Disease Other         NO family h/o urolithiasis     Family history reviewed with patient/caregiver and is  not pertinent to presenting problem.  Social History     Socioeconomic History    Marital status:      Spouse name: Not on file    Number of children: Not on file    Years of education: Not on file    Highest education level: Not on file   Occupational History    Not on file   Tobacco Use    Smoking status: Former     Current packs/day: 0.00     Average packs/day: 0.8 packs/day for 10.0 years (7.5 ttl pk-yrs)     Types: Cigarettes     Start date: 9/3/1958     Quit date: 9/3/1968     Years since quittin.6    Smokeless tobacco: Never   Vaping Use    Vaping status: Never Used   Substance and Sexual Activity    Alcohol use: Yes     Alcohol/week: 0.8 standard drinks of alcohol     Types: 1 Standard drinks or equivalent per week     Comment: Occ    Drug use: No    Sexual activity: Not on file   Other Topics Concern     Service Not Asked    Blood Transfusions Not Asked    Caffeine Concern Yes     Comment: decaf coffee, 1 cup     Occupational Exposure Not Asked    Hobby Hazards Not Asked    Sleep Concern Not Asked    Stress Concern Not Asked    Weight Concern Not Asked    Special Diet Not Asked    Back Care Not Asked    Exercise Not Asked    Bike Helmet Not Asked    Seat Belt Not Asked    Self-Exams Not Asked    Grew up on a farm Not Asked    History of tanning Not Asked    Outdoor occupation Not Asked    Breast feeding Not Asked    Reaction to local anesthetic No    Pt has a pacemaker Yes    Pt has a defibrillator No   Social History Narrative    Not on file     Social Determinants of Health     Financial Resource Strain: Low Risk  (3/21/2024)    [facility]    Overall Financial Resource Strain (CARDIA)     Difficulty of Paying Living Expenses: Not hard at all   Food Insecurity: No Food Insecurity (3/21/2024)    [facility]    Hunger Vital Sign     Worried About  Running Out of Food in the Last Year: Never true     Ran Out of Food in the Last Year: Never true   Transportation Needs: No Transportation Needs (3/21/2024)    [facility]    PRAPARE - Transportation     Lack of Transportation (Medical): No     Lack of Transportation (Non-Medical): No   Physical Activity: Not on file   Stress: Not on file   Social Connections: Not on file   Housing Stability: Low Risk  (3/21/2024)    [facility]    Housing Stability Vital Sign     Unable to Pay for Housing in the Last Year: No     Number of Places Lived in the Last Year: 1     In the last 12 months, was there a time when you did not have a steady place to sleep or slept in a shelter (including now)?: No         ROS:   Positive for stated complaint: vomiting   All other systems reviewed and negative except as noted above.  Constitutional and Vital Signs Reviewed.    Physical Exam:     Findings:    BP 93/49   Pulse 88   Temp 98.7 °F (37.1 °C) (Temporal)   Resp 18   SpO2 99%   GENERAL: well developed, no acute distress, non-toxic appearing, in wheelchair    SKIN: good skin turgor, no obvious rashes  HEAD: normocephalic, atraumatic  EYES: sclera non-icteric bilaterally, conjunctiva clear bilaterally  OROPHARYNX: MMM, pharynx clear, maintaining airway and secretions  NECK: supple, no nuchal rigidity, no trismus, no edema, phonation normal    CARDIO: RRR, normal heart sounds   LUNGS: clear to auscultation bilaterally, no increased WOB, no rales, rhonchi, or wheezes  GI: normoactive bowel sounds, abdomen soft and non-tender   EXTREMITIES: SON without difficulty  NEURO: no focal deficits   PSYCH: alert and oriented at baseline, answering questions appropriately    MDM/Assessment/Plan:   Orders for this encounter:    Orders Placed This Encounter    EKG 12 Lead     Order Specific Question:   Release to  patient     Answer:   Immediate       Labs performed this visit:  Recent Results (from the past 10 hour(s))   EKG 12 Lead    Collection Time: 04/12/24 11:58 AM   Result Value Ref Range    Ventricular rate 82 BPM    Atrial rate 82 BPM    P-R Interval 172 ms    QRS Duration 146 ms    Q-T Interval 422 ms    QTC Calculation (Bezet) 493 ms    P Axis 57 degrees    R Axis -72 degrees    T Axis 105 degrees       Imaging performed this visit:  No orders to display       Medical Decision Making  DDx includes viral illness versus acute intra-abdominal process versus ACS versus diverticular disease versus foodborne illness versus other.  Patient is nontoxic-appearing with hypotension and otherwise stable vitals.  No focal abdominal tenderness on exam.  EKG reviewed, paced rhythm, rate 82, no ST elevation.  Given patient's age and past medical history as well as patient reportedly not feeling back to baseline after several episodes of vomiting last night, recommend patient be seen in ER for further evaluation and treatment.  Patient and patient's daughter verbalized understanding.  Patient declines EMS transport and patient's daughter states she will drive patient directly to [place] [facility] at this time.    Discussed case with Dr. Grant who is in agreement with assessment and plan.         Diagnosis:    ICD-10-CM    1. Vomiting, unspecified vomiting type, unspecified whether nausea present  R11.10       2. Weakness generalized  R53.1             Note: This document was dictated using Dragon medical dictation software.  Proofreading was performed to the best of my ability, but errors may be present.    Constance Dyer PA-C

## 2024-04-12 NOTE — ED INITIAL ASSESSMENT (HPI)
Pt presents with family and caregiver for c/o vomiting that began 7:30 x 2-3 times. Denies diarrhea.   Denies fevers.   No emesis today, Ate two bananas and some fluids.

## 2024-04-12 NOTE — ED INITIAL ASSESSMENT (HPI)
Patient arrives with reports of vomiting starting last night. Denies diarrhea/fevers. Denies urinary symptoms.   Was sent from  and was in ED Tuesday for HTN.

## 2024-06-17 ENCOUNTER — OFFICE VISIT (OUTPATIENT)
Dept: DERMATOLOGY CLINIC | Facility: CLINIC | Age: 89
End: 2024-06-17

## 2024-06-17 DIAGNOSIS — Z08 ENCOUNTER FOR FOLLOW-UP SURVEILLANCE OF SKIN CANCER: ICD-10-CM

## 2024-06-17 DIAGNOSIS — D22.9 MULTIPLE NEVI: ICD-10-CM

## 2024-06-17 DIAGNOSIS — L82.1 SEBORRHEIC KERATOSES: ICD-10-CM

## 2024-06-17 DIAGNOSIS — L57.0 AK (ACTINIC KERATOSIS): Primary | ICD-10-CM

## 2024-06-17 DIAGNOSIS — Z85.828 ENCOUNTER FOR FOLLOW-UP SURVEILLANCE OF SKIN CANCER: ICD-10-CM

## 2024-06-17 DIAGNOSIS — L81.4 LENTIGO: ICD-10-CM

## 2024-06-17 DIAGNOSIS — D23.9 BENIGN NEOPLASM OF SKIN, UNSPECIFIED LOCATION: ICD-10-CM

## 2024-06-17 PROCEDURE — 1160F RVW MEDS BY RX/DR IN RCRD: CPT | Performed by: DERMATOLOGY

## 2024-06-17 PROCEDURE — 1126F AMNT PAIN NOTED NONE PRSNT: CPT | Performed by: DERMATOLOGY

## 2024-06-17 PROCEDURE — 1159F MED LIST DOCD IN RCRD: CPT | Performed by: DERMATOLOGY

## 2024-06-17 PROCEDURE — 99213 OFFICE O/P EST LOW 20 MIN: CPT | Performed by: DERMATOLOGY

## 2024-06-17 PROCEDURE — G2211 COMPLEX E/M VISIT ADD ON: HCPCS | Performed by: DERMATOLOGY

## 2024-06-23 NOTE — PROGRESS NOTES
Lyly Mcclain is a 88 year old female.  HPI:     CC:    Chief Complaint   Patient presents with    Derm Problem     LOV 11/4/23 - Pt presenting for follow up for BCC on right chest that was treated with Imiquimod cream.   Pt has personal hx of Aks and BCC.         Allergies:  Biaxin [clarithromycin], Celebrex [celecoxib], Neurontin [gabapentin], Nsaids, Pcn [penicillins], Radiology contrast iodinated dyes, and Bactrim [sulfamethoxazole w/trimethoprim]    HISTORY:    Past Medical History:    Abscess of tonsil    per [facility]: x2    AV node dysfunction    BCC (basal cell carcinoma of skin)    BCC (basal cell carcinoma)    right chin    Diverticulitis    Esophageal reflux    H/O cystoscopy    per [facility]: cystoscopy with bilateral retrograde pyelography 2013    High cholesterol    History of pregnancy    per [facility]: 4 vaginal deliveries    Incontinence    Mitral valve prolapse    per [facility]:     Neuropathy    fingers, feet    Osteoarthritis    Pacemaker    Parkinson disease (HCC)    Ureteropelvic junction obstruction    per [facility]:      Viral encephalitis (HCC)    per [facility]:     Visual impairment    glasses      Past Surgical History:   Procedure Laterality Date    Cardiac pacemaker placement  2008,2017    D & c      Kidney surgery Right 1963    Pacemaker/defibrillator      Total knee replacement Right 11/06/2018    DR. DURAN      Family History   Problem Relation Age of Onset    Breast Cancer Paternal Aunt 60    Heart Disease Father         CAD    Hypertension Father     Cancer Father         BCC    Cancer Mother 48        stomach cancer     Cancer Other         NO family h/o bladder or kidney cancer    Kidney Disease Other         NO family h/o urolithiasis      Social History     Socioeconomic History    Marital status:    Tobacco Use    Smoking status: Former     Current packs/day: 0.00     Average packs/day: 0.8 packs/day for 10.0 years (7.5 ttl pk-yrs)     Types: Cigarettes     Start date:  9/3/1958     Quit date: 9/3/1968     Years since quittin.8    Smokeless tobacco: Never   Vaping Use    Vaping status: Never Used   Substance and Sexual Activity    Alcohol use: Yes     Alcohol/week: 0.8 standard drinks of alcohol     Types: 1 Standard drinks or equivalent per week     Comment: Occ    Drug use: No   Other Topics Concern    Caffeine Concern Yes     Comment: decaf coffee, 1 cup     Reaction to local anesthetic No    Pt has a pacemaker Yes    Pt has a defibrillator No     Social Determinants of Health     Financial Resource Strain: Low Risk  (3/21/2024)    [facility]    Overall Financial Resource Strain (CARDIA)     Difficulty of Paying Living Expenses: Not hard at all   Food Insecurity: No Food Insecurity (3/21/2024)    [facility]    Hunger Vital Sign     Worried About Running Out of Food in the Last Year: Never true     Ran Out of Food in the Last Year: Never true   Transportation Needs: No Transportation Needs (3/21/2024)    [facility]    PRAPARE - Transportation     Lack of Transportation (Medical): No     Lack of Transportation (Non-Medical): No   Housing Stability: Low Risk  (3/21/2024)    [facility]    Housing Stability Vital Sign     Unable to Pay for Housing in the Last Year: No     Number of Places Lived in the Last Year: 1     In the last 12 months, was there a time when you did not have a steady place to sleep or slept in a shelter (including now)?: No        Current Outpatient Medications   Medication Sig Dispense Refill    ROSUVASTATIN 10 MG Oral Tab TAKE ONE TABLET BY MOUTH ONE TIME DAILY 90 tablet 1    Calcium Carbonate Antacid 500 MG Oral Chew Tab Chew 1 tablet (500 mg total) by mouth every 4 (four) hours  as needed.  0    Pantoprazole Sodium 40 MG Oral Tab EC Take 1 tablet (40 mg total) by mouth every morning before breakfast.  0    aspirin 81 MG Oral Tab Take 1 tablet (81 mg total) by mouth 2 (two) times daily.      melatonin 3 MG Oral Tab Take 1 tablet (3 mg total) by mouth nightly.      Senna 8.6 MG Oral Tab Take 1 tablet (8.6 mg total) by mouth nightly.      Methylcellulose, Laxative, Oral Powder Take by mouth daily.      carbidopa-levodopa  MG Oral Tablet Dispersible Take 0.5 tablets by mouth as needed.      Coenzyme Q10 (COQ10 OR) Take 20 mg by mouth daily.        carbidopa-levodopa  MG Oral Tab Take 1.5 tablets by mouth 3 (three) times daily.      Polyethylene Glycol 3350 (MIRALAX OR) Take 17 g by mouth daily.        Multiple Vitamin (MULTI-VITAMINS) Oral Tab Take 1 tablet by mouth daily.      ondansetron 4 MG Oral Tablet Dispersible Take 1 tablet (4 mg total) by mouth every 8 (eight) hours as needed for Nausea. (Patient not taking: Reported on 6/17/2024) 6 tablet 0    Imiquimod 5 % External Cream Apply topically  1/2 packet 2 times every week to right chest biopsy site x 6 weeks. This may cause redness and crusting. flu-like symptoms may occur with excess application. (Patient not taking: Reported on 6/17/2024) 12 each 0    docusate sodium 100 MG Oral Cap Take 100 mg by mouth 2 (two) times daily. (Patient not taking: Reported on 6/17/2024) 20 capsule 0    Misc Natural Products (OSTEO BI-FLEX ADV DOUBLE ST OR) Take 2 tablets by mouth daily.   (Patient not taking: Reported on 6/17/2024)       Allergies:   Allergies   Allergen Reactions    Biaxin [Clarithromycin] RASH    Celebrex [Celecoxib] OTHER (SEE COMMENTS)     Abdominal pain    Neurontin [Gabapentin] OTHER (SEE COMMENTS)     shaky    Nsaids OTHER (SEE COMMENTS)     Abdominal pain    Pcn [Penicillins] RASH    Radiology Contrast Iodinated Dyes     Bactrim [Sulfamethoxazole W/Trimethoprim] DIARRHEA       Past Medical History:    Abscess of tonsil     per [facility]: x2    AV node dysfunction    BCC (basal cell carcinoma of skin)    BCC (basal cell carcinoma)    right chin    Diverticulitis    Esophageal reflux    H/O cystoscopy    per [facility]: cystoscopy with bilateral retrograde pyelography     High cholesterol    History of pregnancy    per [facility]: 4 vaginal deliveries    Incontinence    Mitral valve prolapse    per [facility]:     Neuropathy    fingers, feet    Osteoarthritis    Pacemaker    Parkinson disease (HCC)    Ureteropelvic junction obstruction    per [facility]:      Viral encephalitis (HCC)    per [facility]:     Visual impairment    glasses     Past Surgical History:   Procedure Laterality Date    Cardiac pacemaker placement  ,    D & c      Kidney surgery Right 1963    Pacemaker/defibrillator      Total knee replacement Right 2018    DR. DURAN     Social History     Socioeconomic History    Marital status:      Spouse name: Not on file    Number of children: Not on file    Years of education: Not on file    Highest education level: Not on file   Occupational History    Not on file   Tobacco Use    Smoking status: Former     Current packs/day: 0.00     Average packs/day: 0.8 packs/day for 10.0 years (7.5 ttl pk-yrs)     Types: Cigarettes     Start date: 9/3/1958     Quit date: 9/3/1968     Years since quittin.8    Smokeless tobacco: Never   Vaping Use    Vaping status: Never Used   Substance and Sexual Activity    Alcohol use: Yes     Alcohol/week: 0.8 standard drinks of alcohol     Types: 1 Standard drinks or equivalent per week     Comment: Occ    Drug use: No    Sexual activity: Not on file   Other Topics Concern     Service Not Asked    Blood Transfusions Not Asked    Caffeine Concern Yes     Comment: decaf coffee, 1 cup     Occupational Exposure Not Asked    Hobby Hazards Not Asked    Sleep Concern Not Asked    Stress Concern Not Asked    Weight Concern Not Asked    Special Diet Not Asked    Back Care Not Asked     Exercise Not Asked    Bike Helmet Not Asked    Seat Belt Not Asked    Self-Exams Not Asked    Grew up on a farm Not Asked    History of tanning Not Asked    Outdoor occupation Not Asked    Breast feeding Not Asked    Reaction to local anesthetic No    Pt has a pacemaker Yes    Pt has a defibrillator No   Social History Narrative    Not on file     Social Determinants of Health     Financial Resource Strain: Low Risk  (3/21/2024)    [facility]    Overall Financial Resource Strain (CARDIA)     Difficulty of Paying Living Expenses: Not hard at all   Food Insecurity: No Food Insecurity (3/21/2024)    [facility]    Hunger Vital Sign     Worried About Running Out of Food in the Last Year: Never true     Ran Out of Food in the Last Year: Never true   Transportation Needs: No Transportation Needs (3/21/2024)    [facility]    PRAPARE - Transportation     Lack of Transportation (Medical): No     Lack of Transportation (Non-Medical): No   Physical Activity: Not on file   Stress: Not on file   Social Connections: Not on file   Housing Stability: Low Risk  (3/21/2024)    [facility]    Housing Stability Vital Sign     Unable to Pay for Housing in the Last Year: No     Number of Places Lived in the Last Year: 1     In the last 12 months, was there a time when you did not have a steady place to sleep or slept in a shelter (including now)?: No     Family History   Problem Relation Age of Onset    Breast Cancer Paternal Aunt 60    Heart Disease Father         CAD    Hypertension Father     Cancer Father         BCC    Cancer Mother 48        stomach cancer     Cancer Other         NO family h/o bladder or kidney cancer    Kidney Disease Other         NO  family h/o urolithiasis       There were no vitals filed for this visit.    HPI:    Chief Complaint   Patient presents with    Derm Problem     LOV 11/4/23 - Pt presenting for follow up for BCC on right chest that was treated with Imiquimod cream.   Pt has personal hx of Aks and BCC.       Follow-up BCC right chin post ENC10/21  Well-healed.  Has several concerns on left leg back left flank  Past notes/ records and appropriate/relevant lab results including pathology and past body maps reviewed. Updated and new information noted in current visit.      patient previously had worked with Dr. Lola Middleton.  Patient had several removals in the past.  No personal family history of skin cancer.  Multiple underlying health conditions as noted including Parkinson's.  Concern regarding multiple skin lesions and elevated risk of skin cancer in Parkinson's disease .  Patient's father with history of BCC  Concern with multiple lesions over the back and arms     patient presents with concerns above.    Patient has been in their usual state of health.  History, medications, allergies reviewed as noted.      ROS:  Denies any other systemic complaints.  No new or changeing lesions other than noted above. No fevers, chills, night sweats, unusual sun sensitivity.  No other skin complaints.        History, medications, allergies reviewed as noted.       Physical Examination:     Well-developed well-nourished patient alert oriented in no acute distress.  Exam total-body performed, including scalp, head, neck, face,nails, hair, external eyes, including conjunctival mucosa, eyelids, lips external ears, back, chest,/ breasts, axillae,  abdomen, arms, legs, palms.     Multiple light to medium brown, well marginated, uniformly pigmented, macules and papules 6 mm and less scattered on exam. pigmented lesions examined with dermoscopy benign-appearing patterns.     Waxy tannish keratotic papules scattered, cherry-red vascular papules  scattered.    See map today's date for lesions noted .      Otherwise remarkable for lesions as noted on map.  See details of examination  See Assessment /Plan for additional history and physical exam also:    Assessment / plan:    No orders of the defined types were placed in this encounter.      Meds & Refills for this Visit:  Requested Prescriptions      No prescriptions requested or ordered in this encounter         Encounter Diagnoses   Name Primary?    AK (actinic keratosis) Yes    Seborrheic keratoses     Lentigo     Benign neoplasm of skin, unspecified location     Encounter for follow-up surveillance of skin cancer     Multiple nevi        See details on map.      Remarkable for:    Patient seen for follow-up long-term monitoring, treatment of  Multiple skin cancers actinic keratoses, sun damage, patient at increased risk of skin cancer given Parkinson's disease more careful monitoring required.  Patient with history of pacemaker, as well.  Plan of care:  ongoing surveillance, monitoring including regular follow-up due to longer term risk of recurrence, new lesions.  See previous notes.      - Right chest BCC nodular post imiquimod    History BCC right chin post electrodesiccation curettage 10/21 well-healed no evidence recurrence.    Actinic Keratoses.  Precancerous nature discussed. Sun protection, sunscreen/ blocks encouraged .  Monitoring for new lesions.  Sun damage additional recurrent and new actinic keratoses, skin cancers may occur in areas of prior actinic keratoses, related to past sun exposure to minimize current sun exposure.  Sunscreen applied consistently regularly, reapplication and sun protection while driving recommended.    SK left posterior leg reassurance      Risk of additional skin cancers with her Parkinson's discussed.  Continue careful monitoring sun protection    Waxy tan papule at right brow, lentigines, keratoses over the arms hands back tan papule consistent with SK at lower  leg as well  Waxy tan keratotic papules lesions in areas of concern as noted reassurance given.  Benign nature discussed.  Possibility of cryo, alphahydroxy acids over-the-counter retinol's discussed.    Many lesions along the chest back arms legs .  Along the central chest, mid abdomen more stucco like keratoses more diffuse over the lower legs.  No other suspicious lesions.    More prominent stasis changes over the feet and ankles reassurance monitor moisturizer, manage edema.  Improved continue careful monitoring  Overall patient doing well  Recheck 6 months or as needed  Reassurance around benign seborrheic keratoses over the back chest arms reassurance given.  Consider cryo to more bothersome lesions.    Moderate sun damage scattered lentigines ephelides continue careful sun protection.  No suspicious lesions currently.  With history of Parkinson's disease, BCC AK's more consistent follow-up in 6 months or as needed    No other suspicious lesions currently    Discussed alphahydroxy acid lotions for stucco keratoses, more inflamed keratoses such as AmLactin or [place] psoriasis cream  Please refer to map for specific lesions.  See additional diagnoses.  Pros cons of various therapies, risks benefits discussed.Pathophysiology discussed with patient.  Therapeutic options reviewed.  See  Medications in grid.  Instructions reviewed at length.    Benign nevi, seborrheic  keratoses, cherry angiomas:  Reassurance regarding other benign skin lesions.Signs and symptoms of skin cancer, ABCDE's of melanoma discussed with patient. Sunscreen use, sun protection, self exams reviewed.  Followup as noted RTC routine checkup 6 mos - one year or p.r.n.    Encounter Times  Including precharting, reviewing chart, prior notes obtaining history: 5 minutes, medical exam :10 minutes, notes on body map, plan, counseling 10minutes My total time spent caring for the patient on the day of the encounter: 25 minutes       The patient  indicates understanding of these issues and agrees to the plan.  The patient is asked to return as noted in follow-up/ above.    This note was generated using Dragon voice recognition software.  Please contact me regarding any confusion resulting from errors in recognition.

## 2024-10-11 ENCOUNTER — CLINICAL ABSTRACT (OUTPATIENT)
Dept: INFUSION THERAPY | Age: 89
End: 2024-10-11

## 2024-10-17 ENCOUNTER — HOSPITAL ENCOUNTER (OUTPATIENT)
Dept: ULTRASOUND IMAGING | Facility: HOSPITAL | Age: 89
Discharge: HOME | End: 2024-10-17
Attending: INTERNAL MEDICINE
Payer: MEDICARE

## 2024-10-17 DIAGNOSIS — E04.1 THYROID NODULE: ICD-10-CM

## 2024-10-17 PROCEDURE — 76536 US EXAM OF HEAD AND NECK: CPT | Performed by: INTERNAL MEDICINE

## 2024-10-25 ENCOUNTER — HOSPITAL ENCOUNTER (OUTPATIENT)
Dept: INFUSION THERAPY | Age: 89
Discharge: HOME | End: 2024-10-25
Attending: INTERNAL MEDICINE

## 2024-10-25 VITALS
SYSTOLIC BLOOD PRESSURE: 157 MMHG | HEART RATE: 71 BPM | TEMPERATURE: 97.6 F | OXYGEN SATURATION: 96 % | RESPIRATION RATE: 16 BRPM | DIASTOLIC BLOOD PRESSURE: 85 MMHG

## 2024-10-25 DIAGNOSIS — M85.89 OSTEOPENIA OF MULTIPLE SITES: Primary | ICD-10-CM

## 2024-10-25 PROCEDURE — 96372 THER/PROPH/DIAG INJ SC/IM: CPT

## 2024-10-25 PROCEDURE — 10002800 HB RX 250 W HCPCS

## 2024-10-25 RX ADMIN — DENOSUMAB 60 MG: 60 INJECTION SUBCUTANEOUS at 11:27

## 2024-10-25 ASSESSMENT — PAIN SCALES - GENERAL: PAINLEVEL_OUTOF10: 0

## 2025-01-03 ENCOUNTER — OFFICE VISIT (OUTPATIENT)
Dept: DERMATOLOGY CLINIC | Facility: CLINIC | Age: 89
End: 2025-01-03

## 2025-01-03 DIAGNOSIS — L82.1 SEBORRHEIC KERATOSES: ICD-10-CM

## 2025-01-03 DIAGNOSIS — Z08 ENCOUNTER FOR FOLLOW-UP SURVEILLANCE OF SKIN CANCER: ICD-10-CM

## 2025-01-03 DIAGNOSIS — L81.4 LENTIGO: ICD-10-CM

## 2025-01-03 DIAGNOSIS — D22.9 MULTIPLE NEVI: ICD-10-CM

## 2025-01-03 DIAGNOSIS — Z85.828 ENCOUNTER FOR FOLLOW-UP SURVEILLANCE OF SKIN CANCER: ICD-10-CM

## 2025-01-03 DIAGNOSIS — D23.9 BENIGN NEOPLASM OF SKIN, UNSPECIFIED LOCATION: ICD-10-CM

## 2025-01-03 DIAGNOSIS — L57.0 AK (ACTINIC KERATOSIS): Primary | ICD-10-CM

## 2025-01-03 PROCEDURE — G2211 COMPLEX E/M VISIT ADD ON: HCPCS | Performed by: DERMATOLOGY

## 2025-01-03 PROCEDURE — 99213 OFFICE O/P EST LOW 20 MIN: CPT | Performed by: DERMATOLOGY

## 2025-01-03 PROCEDURE — 1160F RVW MEDS BY RX/DR IN RCRD: CPT | Performed by: DERMATOLOGY

## 2025-01-03 PROCEDURE — 1159F MED LIST DOCD IN RCRD: CPT | Performed by: DERMATOLOGY

## 2025-01-03 RX ORDER — CARBIDOPA AND LEVODOPA 25; 100 MG/1; MG/1
2 TABLET, EXTENDED RELEASE ORAL NIGHTLY
COMMUNITY
Start: 2024-09-30

## 2025-01-03 RX ORDER — SODIUM FLUORIDE 6 MG/ML
1 PASTE, DENTIFRICE DENTAL AS DIRECTED
COMMUNITY
Start: 2024-11-25

## 2025-01-03 RX ORDER — FAMOTIDINE 20 MG/1
20 TABLET, FILM COATED ORAL DAILY
COMMUNITY
Start: 2024-11-30

## 2025-01-06 NOTE — PROGRESS NOTES
Lyly Mcclain is a 89 year old female.  HPI:     CC:    Chief Complaint   Patient presents with    Full Skin Exam     LOV 6/2024. Pt present for full body skin exam. Hx of BCC, AK, Sk's and multiple nevi. Family hx of BCC (Father).   Monitoring the following lesions:   Right chest BCC nodular post imiquimod  BCC right chin - Tx w/ electrodesiccation curettage 10/2021  Pt notes spot of concern on L Neck that has been present for a couple weeks, asymptomatic.          Allergies:  Biaxin [clarithromycin], Celebrex [celecoxib], Neurontin [gabapentin], Nsaids, Pcn [penicillins], Radiology contrast iodinated dyes, and Bactrim [sulfamethoxazole w/trimethoprim]    HISTORY:    Past Medical History:    Abscess of tonsil    per [facility]: x2    AV node dysfunction    BCC (basal cell carcinoma of skin)    BCC (basal cell carcinoma)    right chin    Diverticulitis    Esophageal reflux    H/O cystoscopy    per [facility]: cystoscopy with bilateral retrograde pyelography 2013    High cholesterol    History of pregnancy    per [facility]: 4 vaginal deliveries    Incontinence    Mitral valve prolapse    per [facility]:     Neuropathy    fingers, feet    Osteoarthritis    Pacemaker    Parkinson disease (HCC)    Ureteropelvic junction obstruction    per [facility]:      Viral encephalitis (HCC)    per [facility]:     Visual impairment    glasses      Past Surgical History:   Procedure Laterality Date    Cardiac pacemaker placement  2008,2017    D & c      Kidney surgery Right 1963    Pacemaker/defibrillator      Total knee replacement Right 11/06/2018    DR. DURAN      Family History   Problem Relation Age of Onset    Breast Cancer Paternal Aunt 60    Heart Disease Father         CAD    Hypertension Father     Cancer Father         BCC    Cancer Mother 48        stomach cancer     Cancer Other         NO family h/o bladder or kidney cancer    Kidney Disease Other         NO family h/o urolithiasis      Social History     Socioeconomic History     Marital status:    Tobacco Use    Smoking status: Former     Current packs/day: 0.00     Average packs/day: 0.8 packs/day for 10.0 years (7.5 ttl pk-yrs)     Types: Cigarettes     Start date: 9/3/1958     Quit date: 9/3/1968     Years since quittin.3    Smokeless tobacco: Never   Vaping Use    Vaping status: Never Used   Substance and Sexual Activity    Alcohol use: Yes     Alcohol/week: 0.8 standard drinks of alcohol     Types: 1 Standard drinks or equivalent per week     Comment: Occ    Drug use: No   Other Topics Concern    Caffeine Concern Yes     Comment: decaf coffee, 1 cup     Reaction to local anesthetic No    Pt has a pacemaker Yes    Pt has a defibrillator No     Social Drivers of Health     Financial Resource Strain: Low Risk  (2024)    Received from [facility] [place]    Overall Financial Resource Strain (CARDIA)     Difficulty of Paying Living Expenses: Not hard at all   Food Insecurity: No Food Insecurity (2024)    Received from [facility] [place]    Hunger Vital Sign     Worried About Running Out of Food in the Last Year: Never true     Ran Out of Food in the Last Year: Never true   Transportation Needs: No Transportation Needs (2024)    Received from [facility] [place]    PRAPARE - Transportation     Lack of Transportation (Medical): No     Lack of Transportation (Non-Medical): No   Housing Stability: Unknown (2024)    Received from [facility] [place]    Housing Stability Vital Sign     Unable to Pay for Housing in the Last Year: No     Homeless in the Last Year: No        Current Outpatient Medications   Medication Sig Dispense Refill    carbidopa-levodopa ER  MG Oral Tab CR Take 2 tablets by mouth nightly.      denosumab 60 MG/ML Subcutaneous Solution Prefilled Syringe Inject 1 mL (60 mg total) into the skin.      famotidine 20 MG Oral Tab Take 1 tablet (20 mg total) by mouth daily.      SODIUM FLUORIDE  5000 PPM 1.1 % Dental Paste Take 1 Bottle by mouth As Directed.      Acetaminophen (TYLENOL ARTHRITIS PAIN OR) Take 650 mg by mouth in the morning and 650 mg at noon and 650 mg in the evening.      ondansetron 4 MG Oral Tablet Dispersible Take 1 tablet (4 mg total) by mouth every 8 (eight) hours as needed for Nausea. 6 tablet 0    ROSUVASTATIN 10 MG Oral Tab TAKE ONE TABLET BY MOUTH ONE TIME DAILY 90 tablet 1    Calcium Carbonate Antacid 500 MG Oral Chew Tab Chew 1 tablet (500 mg total) by mouth every 4 (four) hours as needed.  0    aspirin 81 MG Oral Tab Take 1 tablet (81 mg total) by mouth 2 (two) times daily.      melatonin 3 MG Oral Tab Take 1 tablet (3 mg total) by mouth nightly.      Senna 8.6 MG Oral Tab Take 1 tablet (8.6 mg total) by mouth nightly.      Methylcellulose, Laxative, Oral Powder Take by mouth daily.      carbidopa-levodopa  MG Oral Tablet Dispersible Take 0.5 tablets by mouth as needed.      Coenzyme Q10 (COQ10 OR) Take 20 mg by mouth daily.        carbidopa-levodopa  MG Oral Tab Take 1.5 tablets by mouth 3 (three) times daily.      Polyethylene Glycol 3350 (MIRALAX OR) Take 17 g by mouth daily.        Multiple Vitamin (MULTI-VITAMINS) Oral Tab Take 1 tablet by mouth daily.      Imiquimod 5 % External Cream Apply topically  1/2 packet 2 times every week to right chest biopsy site x 6 weeks. This may cause redness and crusting. flu-like symptoms may occur with excess application. (Patient not taking: Reported on 1/3/2025) 12 each 0    Pantoprazole Sodium 40 MG Oral Tab EC Take 1 tablet (40 mg total) by mouth every morning before breakfast. (Patient not taking: Reported on 1/3/2025)  0    docusate sodium 100 MG Oral Cap Take 100 mg by mouth 2 (two) times daily. (Patient not taking: Reported on 1/3/2025) 20 capsule 0    Misc Natural Products (OSTEO BI-FLEX ADV DOUBLE ST OR) Take 2 tablets by mouth daily.   (Patient not taking: Reported on 1/3/2025)       Allergies:   Allergies    Allergen Reactions    Biaxin [Clarithromycin] RASH    Celebrex [Celecoxib] OTHER (SEE COMMENTS)     Abdominal pain    Neurontin [Gabapentin] OTHER (SEE COMMENTS)     shaky    Nsaids OTHER (SEE COMMENTS)     Abdominal pain    Pcn [Penicillins] RASH    Radiology Contrast Iodinated Dyes     Bactrim [Sulfamethoxazole W/Trimethoprim] DIARRHEA       Past Medical History:    Abscess of tonsil    per [facility]: x2    AV node dysfunction    BCC (basal cell carcinoma of skin)    BCC (basal cell carcinoma)    right chin    Diverticulitis    Esophageal reflux    H/O cystoscopy    per [facility]: cystoscopy with bilateral retrograde pyelography     High cholesterol    History of pregnancy    per [facility]: 4 vaginal deliveries    Incontinence    Mitral valve prolapse    per [facility]:     Neuropathy    fingers, feet    Osteoarthritis    Pacemaker    Parkinson disease (HCC)    Ureteropelvic junction obstruction    per [facility]:      Viral encephalitis (HCC)    per [facility]:     Visual impairment    glasses     Past Surgical History:   Procedure Laterality Date    Cardiac pacemaker placement  ,    D & c      Kidney surgery Right 1963    Pacemaker/defibrillator      Total knee replacement Right 2018    DR. DURAN     Social History     Socioeconomic History    Marital status:      Spouse name: Not on file    Number of children: Not on file    Years of education: Not on file    Highest education level: Not on file   Occupational History    Not on file   Tobacco Use    Smoking status: Former     Current packs/day: 0.00     Average packs/day: 0.8 packs/day for 10.0 years (7.5 ttl pk-yrs)     Types: Cigarettes     Start date: 9/3/1958     Quit date: 9/3/1968     Years since quittin.3    Smokeless tobacco: Never   Vaping Use    Vaping status: Never Used   Substance and Sexual Activity    Alcohol use: Yes     Alcohol/week: 0.8 standard drinks of alcohol     Types: 1 Standard drinks or equivalent per week      Comment: Occ    Drug use: No    Sexual activity: Not on file   Other Topics Concern     Service Not Asked    Blood Transfusions Not Asked    Caffeine Concern Yes     Comment: decaf coffee, 1 cup     Occupational Exposure Not Asked    Hobby Hazards Not Asked    Sleep Concern Not Asked    Stress Concern Not Asked    Weight Concern Not Asked    Special Diet Not Asked    Back Care Not Asked    Exercise Not Asked    Bike Helmet Not Asked    Seat Belt Not Asked    Self-Exams Not Asked    Grew up on a farm Not Asked    History of tanning Not Asked    Outdoor occupation Not Asked    Breast feeding Not Asked    Reaction to local anesthetic No    Pt has a pacemaker Yes    Pt has a defibrillator No   Social History Narrative    Not on file     Social Drivers of Health     Financial Resource Strain: Low Risk  (9/19/2024)    Received from [facility]    Overall Financial Resource Strain (CARDIA)     Difficulty of Paying Living Expenses: Not hard at all   Food Insecurity: No Food Insecurity (9/19/2024)    Received from [facility]    Hunger Vital Sign     Worried About Running Out of Food in the Last Year: Never true     Ran Out of Food in the Last Year: Never true   Transportation Needs: No Transportation Needs (9/19/2024)    Received from [facility]    PRAPARE - Transportation     Lack of Transportation (Medical): No     Lack of Transportation (Non-Medical): No   Physical Activity: Not on file   Stress: Not on file   Social Connections: Not on file   Housing Stability: Unknown (9/19/2024)    Received from [facility]    Housing Stability Vital Sign     Unable to Pay for Housing in the Last Year: No     Number of Times Moved in the Last Year: Not on file     Homeless in the Last Year: No     Family History   Problem Relation Age of Onset    Breast Cancer Paternal Aunt 60    Heart Disease Father         CAD    Hypertension Father      Cancer Father         BCC    Cancer Mother 48        stomach cancer     Cancer Other         NO family h/o bladder or kidney cancer    Kidney Disease Other         NO family h/o urolithiasis       There were no vitals filed for this visit.    HPI:    Chief Complaint   Patient presents with    Full Skin Exam     LOV 6/2024. Pt present for full body skin exam. Hx of BCC, AK, Sk's and multiple nevi. Family hx of BCC (Father).   Monitoring the following lesions:   Right chest BCC nodular post imiquimod  BCC right chin - Tx w/ electrodesiccation curettage 10/2021  Pt notes spot of concern on L Neck that has been present for a couple weeks, asymptomatic.    Patient here with her daughter.  Several concerns in particular new lesion on the lateral neck.  Generally has been in stable health.    Follow-up BCC right chin post E&C10/21    Past notes/ records and appropriate/relevant lab results including pathology and past body maps reviewed. Updated and new information noted in current visit.     As noted previously patient previously had worked with Dr. Lola Middleton.  Patient had several removals in the past.  No personal family history of skin cancer.  Multiple underlying health conditions as noted including Parkinson's.  Concern regarding multiple skin lesions and elevated risk of skin cancer in Parkinson's disease .  Patient's father with history of BCC  Concern with multiple lesions over the back and arms     patient presents with concerns above.    Patient has been in their usual state of health.  History, medications, allergies reviewed as noted.      ROS:  Denies any other systemic complaints.  No new or changeing lesions other than noted above. No fevers, chills, night sweats, unusual sun sensitivity.  No other skin complaints.        History, medications, allergies reviewed as noted.       Physical Examination:     Well-developed well-nourished patient alert oriented in no acute distress.  Exam performed of  appropriate involved areas     multiple light to medium brown, well marginated, uniformly pigmented, macules and papules 6 mm and less scattered on exam. pigmented lesions examined with dermoscopy benign-appearing patterns.     Waxy tannish keratotic papules scattered, cherry-red vascular papules scattered.    See map today's date for lesions noted .      Otherwise remarkable for lesions as noted on map.  See details of examination  See Assessment /Plan for additional history and physical exam also:    Assessment / plan:    No orders of the defined types were placed in this encounter.      Meds & Refills for this Visit:  Requested Prescriptions      No prescriptions requested or ordered in this encounter         Encounter Diagnoses   Name Primary?    AK (actinic keratosis) Yes    Seborrheic keratoses     Lentigo     Benign neoplasm of skin, unspecified location     Encounter for follow-up surveillance of skin cancer     Multiple nevi        See details on map.      Remarkable for:    Fall/Risk Assessment      Questions and rooming assessed by staff not caring forward.  Continue follow-up with PCP and specialist       Patient seen for follow-up long-term monitoring, treatment of  Multiple skin cancers actinic keratoses, sun damage, patient at increased risk of skin cancer given Parkinson's disease more careful monitoring required.  Patient with history of pacemaker, as well.  Plan of care:  ongoing surveillance, monitoring including regular follow-up due to longer term risk of recurrence, new lesions.  See previous notes.    Upper body exam at today's visit patient has compression stockings in place plan to check lower extremities at follow-up over the summer.  Per patient and her daughter no concerning lesions in this area at this time history of stasis dermatitis improved control with compression continue    - Right chest BCC nodular post imiquimod has healed well no recurrence    History BCC right chin post  electrodesiccation curettage 10/21 well-healed no evidence recurrence.    No active AK's  Actinic Keratoses.  Precancerous nature discussed. Sun protection, sunscreen/ blocks encouraged .  Monitoring for new lesions.  Sun damage additional recurrent and new actinic keratoses, skin cancers may occur in areas of prior actinic keratoses, related to past sun exposure to minimize current sun exposure.  Sunscreen applied consistently regularly, reapplication and sun protection while in the car recommended.    SK left posterior leg reassurance    Multiple benign keratoses scattered over the face, in particular lesion at left lateral neck reassurance.  Risk of additional skin cancers with her Parkinson's discussed.  Continue careful monitoring sun protection    Waxy tan papule at right brow, lentigines, keratoses over the arms hands back tan papule consistent with SK at lower leg as well  Waxy tan keratotic papules lesions in areas of concern as noted reassurance given.  Benign nature discussed.  Possibility of cryo, alphahydroxy acids over-the-counter retinol's discussed.    Many lesions along the chest back arms  .  Along the central chest, mid abdomen more stucco like keratoses   More prominent stasis changes over the feet and ankles reassurance monitor moisturizer, manage edema.  Improved continue careful monitoring  Overall patient doing well  Recheck 6 months or as needed  Reassurance around benign seborrheic keratoses over the back chest arms reassurance given.  Consider cryo to more bothersome lesions.    Moderate sun damage scattered lentigines ephelides continue careful sun protection.  No suspicious lesions currently.  With history of Parkinson's disease, BCC AK's more consistent follow-up in 6 months or as needed    No other suspicious lesions currently    Discussed alphahydroxy acid lotions for stucco keratoses, more inflamed keratoses such as AmLactin or [place] psoriasis cream  Please refer to map for  specific lesions.  See additional diagnoses.  Pros cons of various therapies, risks benefits discussed.Pathophysiology discussed with patient.  Therapeutic options reviewed.  See  Medications in grid.  Instructions reviewed at length.    Benign nevi, seborrheic  keratoses, cherry angiomas:  Reassurance regarding other benign skin lesions.    General skin care questions answered.   Reassurance regarding benign skin lesions.    Monitor for new or changing lesions. Signs and symptoms of skin cancer, ABCDE's of melanoma ( additional information available at AAD.org, skincancer.org) Encourage Sunscreen (broad-spectrum, ideally mineral-based-UVA/UVB -SPF 30 or higher) use encouraged, sun protection/sun protective clothing, self exams reviewed Followup as noted RTC ---routine checkup 6 mos -one year or p.r.n.    Encounter Times   Including precharting, reviewing chart, prior notes obtaining history: 10 minutes, medical exam :10 minutes, notes on body map, plan, counseling 10minutes My total time spent caring for the patient on the day of the encounter: 30 minutes     The patient indicates understanding of these issues and agrees to the plan.  The patient is asked to return as noted in follow-up/ above.    This note was generated using Dragon voice recognition software.  Please contact me regarding any confusion resulting from errors in recognition..  Note to patient and family: The 21st Century Cures Act makes medical notes like these available to patients. However, be advised this is a medical document. It is intended as [id number] communication and monitoring of a patient's care needs. It is written in medical language and may contain abbreviations or verbiage that are unfamiliar. It may appear blunt or direct. Medical documents are intended to carry relevant information, facts as evident and the clinical opinion of the practitioner.

## 2025-03-11 ENCOUNTER — HOSPITAL ENCOUNTER (OUTPATIENT)
Dept: BONE DENSITY | Age: 89
Discharge: HOME | End: 2025-03-11
Attending: INTERNAL MEDICINE
Payer: MEDICARE

## 2025-03-11 DIAGNOSIS — M81.0 OSTEOPOROSIS: ICD-10-CM

## 2025-03-11 PROCEDURE — 77080 DXA BONE DENSITY AXIAL: CPT | Performed by: INTERNAL MEDICINE

## 2025-03-24 ENCOUNTER — EXTERNAL LAB (OUTPATIENT)
Dept: HEALTH INFORMATION MANAGEMENT | Facility: OTHER | Age: 89
End: 2025-03-24

## 2025-03-24 LAB
25(OH)D3+25(OH)D2 SERPL-MCNC: 41 NG/ML (ref 30–80)
MAGNESIUM SERPL-MCNC: 1.9 MG/DL (ref 1.8–2.5)
PHOSPHATE SERPL-MCNC: 3.6 MG/DL (ref 2.5–4.7)

## 2025-04-03 ENCOUNTER — HOSPITAL ENCOUNTER (EMERGENCY)
Facility: HOSPITAL | Age: 89
Discharge: HOME | End: 2025-04-03
Attending: EMERGENCY MEDICINE
Payer: MEDICARE

## 2025-04-03 ENCOUNTER — EXTERNAL LAB (OUTPATIENT)
Dept: HEALTH INFORMATION MANAGEMENT | Facility: OTHER | Age: 89
End: 2025-04-03

## 2025-04-03 VITALS
HEART RATE: 90 BPM | OXYGEN SATURATION: 99 % | SYSTOLIC BLOOD PRESSURE: 170 MMHG | WEIGHT: 125 LBS | RESPIRATION RATE: 16 BRPM | TEMPERATURE: 98 F | HEIGHT: 64 IN | DIASTOLIC BLOOD PRESSURE: 88 MMHG | BODY MASS INDEX: 21.34 KG/M2

## 2025-04-03 DIAGNOSIS — R03.0 ELEVATED BLOOD PRESSURE READING: Primary | ICD-10-CM

## 2025-04-03 LAB
ALBUMIN SERPL-MCNC: 4.5 G/DL (ref 3.2–4.8)
ALP LIVER SERPL-CCNC: 36 U/L
ALT SERPL-CCNC: <7 U/L
ANION GAP SERPL CALC-SCNC: 5 MMOL/L (ref 0–18)
ANION GAP SERPL CALC-SCNC: 5 MMOL/L (ref 0–18)
AST SERPL-CCNC: 24 U/L (ref ?–34)
BASOPHILS # BLD AUTO: 0.06 X10(3) UL (ref 0–0.2)
BASOPHILS NFR BLD AUTO: 0.8 %
BILIRUB DIRECT SERPL-MCNC: 0.2 MG/DL (ref ?–0.3)
BILIRUB SERPL-MCNC: 0.7 MG/DL (ref 0.2–1.1)
BUN BLD-MCNC: 13 MG/DL (ref 9–23)
BUN SERPL-MCNC: 13 MG/DL (ref 9–23)
BUN/CREAT SERPL: 18.8 (ref 10–20)
BUN/CREAT SERPL: 18.8 (ref 10–20)
CALCIUM BLD-MCNC: 9.3 MG/DL (ref 8.7–10.4)
CALCIUM SERPL-MCNC: 9.3 MG/DL (ref 8.7–10.4)
CALCULATED OSMO: 278 MOSM/KG (ref 275–295)
CHLORIDE SERPL-SCNC: 98 MMOL/L (ref 98–112)
CHLORIDE SERPL-SCNC: 98 MMOL/L (ref 98–112)
CO2 SERPL-SCNC: 31 MMOL/L (ref 21–32)
CO2 SERPL-SCNC: 31 MMOL/L (ref 21–32)
CREAT BLD-MCNC: 0.69 MG/DL
CREAT SERPL-MCNC: 0.69 MG/DL (ref 0.55–1.02)
DEPRECATED RDW RBC AUTO: 47.7 FL (ref 35.1–46.3)
EGFRCR SERPLBLD CKD-EPI 2021: 83 ML/MIN/1.73M2 (ref 60–?)
EOSINOPHIL # BLD AUTO: 0.13 X10(3) UL (ref 0–0.7)
EOSINOPHIL NFR BLD AUTO: 1.8 %
ERYTHROCYTE [DISTWIDTH] IN BLOOD BY AUTOMATED COUNT: 13.4 % (ref 11–15)
GFR SERPLBLD SCHWARTZ-ARVRAT: 83 ML/MIN/1.73M2
GLUCOSE BLD-MCNC: 102 MG/DL (ref 70–99)
GLUCOSE SERPL-MCNC: 102 MG/DL (ref 70–99)
HCT VFR BLD AUTO: 35.4 %
HGB BLD-MCNC: 12.4 G/DL
IMM GRANULOCYTES # BLD AUTO: 0.05 X10(3) UL (ref 0–1)
IMM GRANULOCYTES NFR BLD: 0.7 %
LENGTH OF FAST TIME PATIENT: ABNORMAL H
LYMPHOCYTES # BLD AUTO: 1.38 X10(3) UL (ref 1–4)
LYMPHOCYTES NFR BLD AUTO: 19.4 %
MCH RBC QN AUTO: 33.8 PG (ref 26–34)
MCHC RBC AUTO-ENTMCNC: 35 G/DL (ref 31–37)
MCV RBC AUTO: 96.5 FL
MONOCYTES # BLD AUTO: 0.71 X10(3) UL (ref 0.1–1)
MONOCYTES NFR BLD AUTO: 10 %
NEUTROPHILS # BLD AUTO: 4.78 X10 (3) UL (ref 1.5–7.7)
NEUTROPHILS # BLD AUTO: 4.78 X10(3) UL (ref 1.5–7.7)
NEUTROPHILS NFR BLD AUTO: 67.3 %
OSMOLALITY SERPL CALC.SUM OF ELEC: 278 MOSM/KG (ref 275–295)
PLATELET # BLD AUTO: 202 10(3)UL (ref 150–450)
POTASSIUM SERPL-SCNC: 4.1 MMOL/L (ref 3.5–5.1)
POTASSIUM SERPL-SCNC: 4.1 MMOL/L (ref 3.5–5.1)
PROT SERPL-MCNC: 7.1 G/DL (ref 5.7–8.2)
RBC # BLD AUTO: 3.67 X10(6)UL
SODIUM SERPL-SCNC: 134 MMOL/L (ref 136–145)
SODIUM SERPL-SCNC: 134 MMOL/L (ref 136–145)
WBC # BLD AUTO: 7.1 X10(3) UL (ref 4–11)

## 2025-04-03 PROCEDURE — 80048 BASIC METABOLIC PNL TOTAL CA: CPT | Performed by: EMERGENCY MEDICINE

## 2025-04-03 PROCEDURE — 36415 COLL VENOUS BLD VENIPUNCTURE: CPT

## 2025-04-03 PROCEDURE — 99284 EMERGENCY DEPT VISIT MOD MDM: CPT

## 2025-04-03 PROCEDURE — 80076 HEPATIC FUNCTION PANEL: CPT | Performed by: EMERGENCY MEDICINE

## 2025-04-03 PROCEDURE — 99283 EMERGENCY DEPT VISIT LOW MDM: CPT

## 2025-04-03 PROCEDURE — 85025 COMPLETE CBC W/AUTO DIFF WBC: CPT | Performed by: EMERGENCY MEDICINE

## 2025-04-03 RX ORDER — ACETAMINOPHEN 325 MG/1
650 TABLET ORAL ONCE
Status: DISCONTINUED | OUTPATIENT
Start: 2025-04-03 | End: 2025-04-03

## 2025-04-03 RX ORDER — CARBIDOPA AND LEVODOPA 25; 100 MG/1; MG/1
1 TABLET ORAL ONCE
Status: DISCONTINUED | OUTPATIENT
Start: 2025-04-03 | End: 2025-04-03

## 2025-04-03 NOTE — ED PROVIDER NOTES
Patient Seen in: [facility] Emergency Department    History     Chief Complaint   Patient presents with    Hypertension       HPI    89-year-old female presents to the ER for evaluation of elevated blood pressure.  Patient states that she had checked her blood pressure earlier today and has not spiked up.  She rechecked an hour later and it again was elevated.  Patient states she got worried and began feeling shaky and slightly lightheaded then.  She is concerned that she does not typically have high blood pressure and thought she needed to come to the hospital to be checked.  She denies any chest pain, headache, nausea, fevers, chills.  She states she has not been sleeping well recently    History from [facility]:       External Records Reviewed: Chart review over the past 5 months, patient has multiple physician visits.  Her blood pressure typically runs around 105 systolic.  She has 3 or 4 episodes of elevated blood pressure as high as 160/95.    History reviewed.   Past Medical History:    Abscess of tonsil    per [facility]: x2    AV node dysfunction    BCC (basal cell carcinoma of skin)    BCC (basal cell carcinoma)    right chin    Diverticulitis    Esophageal reflux    H/O cystoscopy    per [facility]: cystoscopy with bilateral retrograde pyelography 2013    High cholesterol    History of pregnancy    per [facility]: 4 vaginal deliveries    Incontinence    Mitral valve prolapse    per [facility]:     Neuropathy    fingers, feet    Osteoarthritis    Pacemaker    Parkinson disease (HCC)    Ureteropelvic junction obstruction    per [facility]:      Viral encephalitis (HCC)    per [facility]:     Visual impairment    glasses       History reviewed.   Past Surgical History:   Procedure Laterality Date    Cardiac pacemaker placement  2008,2017    D & c      Kidney surgery Right 1963    Pacemaker/defibrillator      Total knee replacement Right 11/06/2018    DR. DURAN         Medications :  Prescriptions Prior to  Admission[1]     Family History   Problem Relation Age of Onset    Breast Cancer Paternal Aunt 60    Heart Disease Father         CAD    Hypertension Father     Cancer Father         BCC    Cancer Mother 48        stomach cancer     Cancer Other         NO family h/o bladder or kidney cancer    Kidney Disease Other         NO family h/o urolithiasis       Smoking Status:   Social History     Socioeconomic History    Marital status:    Tobacco Use    Smoking status: Former     Current packs/day: 0.00     Average packs/day: 0.8 packs/day for 10.0 years (7.5 ttl pk-yrs)     Types: Cigarettes     Start date: 9/3/1958     Quit date: 9/3/1968     Years since quittin.6    Smokeless tobacco: Never   Vaping Use    Vaping status: Never Used   Substance and Sexual Activity    Alcohol use: Yes     Alcohol/week: 0.8 standard drinks of alcohol     Types: 1 Standard drinks or equivalent per week     Comment: Occ    Drug use: No   Other Topics Concern    Caffeine Concern Yes     Comment: decaf coffee, 1 cup     Reaction to local anesthetic No    Pt has a pacemaker Yes    Pt has a defibrillator No       Constitutional and vital signs reviewed.      Social History and Family History elements reviewed from today, pertinent positives to the presenting problem noted.    Physical Exam     ED Triage Vitals [25 1441]   BP (!) 186/99   Pulse 94   Resp 16   Temp 97.7 °F (36.5 °C)   Temp src    SpO2 100 %   O2 Device None (Room air)       Physical Exam   Constitutional: AAOx3, well nourished, NAD  HEENT: Normocephalic, PERRLA, MMM  CV: s1s2+, RRR, no m/r/g, normal distal pulses  Pulmonary/Chest: CTA b/l with no rales, wheezes.  No chest wall tenderness  Abdominal: Nontender.  Nondistended. Soft. Bowel sounds are normal.   Neck/Back:   :   Musculoskeletal: Normal range of motion. No deformity.   Neurological: Awake, alert. Normal reflexes. No cranial nerve deficit.    Skin: Skin is warm and dry. No rash noted. No erythema.    Psychiatric:      All measures to prevent infection transmission during my interaction with the patient were taken. The patient was already wearing a droplet mask on my arrival to the room. Personal protective equipment was worn throughout the duration of the exam.      ED Course        Labs Reviewed   BASIC METABOLIC PANEL (8) - Abnormal; Notable for the following components:       Result Value    Glucose 102 (*)     Sodium 134 (*)     All other components within normal limits   CBC WITH DIFFERENTIAL WITH PLATELET - Abnormal; Notable for the following components:    RBC 3.67 (*)     RDW-SD 47.7 (*)     All other components within normal limits   HEPATIC FUNCTION PANEL (7) - Abnormal; Notable for the following components:    ALT <7 (*)     Alkaline Phosphatase 36 (*)     All other components within normal limits     My Independent Interpretation of EKG (if performed):     Imaging Results Available and Reviewed while in ED: No results found.  ED Medications Administered:   Medications   carbidopa-levodopa (SINEMET)  MG per tab 1 tablet (has no administration in time range)   acetaminophen (Tylenol) tab 650 mg (has no administration in time range)             MDM     Vitals:    04/03/25 1441   BP: (!) 186/99   Pulse: 94   Resp: 16   Temp: 97.7 °F (36.5 °C)   SpO2: 100%   Weight: 56.7 kg   Height: 162.6 cm (5' 4\")     *I personally reviewed and interpreted all ED vitals.    Independent Interpretation of Studies:     Social Determinants of Health:     Procedures:      Differential/MDM/Shared Decision Making: Differential Diagnosis includes blood pressure, anxiety, dehydration, electrolyte abnormality, infection, others.      The patient already  has a past medical history of Abscess of tonsil, AV node dysfunction, BCC (basal cell carcinoma of skin) (2023), BCC (basal cell carcinoma) (2021), Diverticulitis, Esophageal reflux, H/O cystoscopy (11/05/2013), High cholesterol, History of pregnancy, Incontinence,  Mitral valve prolapse, Neuropathy, Osteoarthritis, Pacemaker, Parkinson disease (HCC), Ureteropelvic junction obstruction (1963), Viral encephalitis (HCC), and Visual impairment.  to contribute to the complexity of this ED evaluation.           Medications, Diagnostics, or Disposition considered but not done:     Patient has unremarkable physical exam and labs..  Management of case was discussed with patient and have explained that she does not need to come the hospital for elevated blood pressure without any other symptoms.      Condition upon leaving the department: Stable    Disposition and Plan     Clinical Impression:  1. Elevated blood pressure reading        Disposition:  Discharge    Follow-up:  Krista Le MD  [address] [place] [address] [id number] [phone]    Call in 2 day(s)        Medications Prescribed:  Current Discharge Medication List                   [1] (Not in a hospital admission)

## 2025-04-03 NOTE — ED INITIAL ASSESSMENT (HPI)
Pt arrives via EMS for concern for elevated BP throughout the day, systolic readings above 180 at home. Pt reports mild headache this morning which has since resolved. Denies dizziness or blurred vision. Pt is AAOx4, verbal. No distress. Pt reports she has a pacemaker and hx parkinsons, tends to have intermittently elevated BP

## 2025-04-16 ENCOUNTER — CLINICAL ABSTRACT (OUTPATIENT)
Dept: INFUSION THERAPY | Age: 89
End: 2025-04-16

## 2025-04-24 ENCOUNTER — APPOINTMENT (OUTPATIENT)
Dept: INFUSION THERAPY | Age: 89
End: 2025-04-24
Attending: INTERNAL MEDICINE

## 2025-05-19 ENCOUNTER — HOSPITAL ENCOUNTER (OUTPATIENT)
Dept: INFUSION THERAPY | Age: 89
Discharge: STILL ADMITTED OR ADMINISTRATIVE | End: 2025-05-19
Attending: INTERNAL MEDICINE

## 2025-05-19 VITALS
TEMPERATURE: 98.3 F | SYSTOLIC BLOOD PRESSURE: 116 MMHG | OXYGEN SATURATION: 99 % | HEART RATE: 78 BPM | RESPIRATION RATE: 16 BRPM | DIASTOLIC BLOOD PRESSURE: 73 MMHG

## 2025-05-19 DIAGNOSIS — M85.89 OSTEOPENIA OF MULTIPLE SITES: Primary | ICD-10-CM

## 2025-05-19 PROCEDURE — 10002800 HB RX 250 W HCPCS: Performed by: INTERNAL MEDICINE

## 2025-05-19 PROCEDURE — 96372 THER/PROPH/DIAG INJ SC/IM: CPT | Performed by: INTERNAL MEDICINE

## 2025-05-19 RX ADMIN — DENOSUMAB 60 MG: 60 INJECTION SUBCUTANEOUS at 13:21

## 2025-05-19 ASSESSMENT — PAIN SCALES - GENERAL: PAINLEVEL_OUTOF10: 0

## 2025-05-28 NOTE — PLAN OF CARE
DISCHARGE PLANNING    • Discharge to home or other facility with appropriate resources Progressing        PAIN - ADULT    • Verbalizes/displays adequate comfort level or patient's stated pain goal Progressing        Patient Centered Care    • Patient prefe Plan: Pt noted low blood pressure on 100mg spironolactone. Render In Strict Bullet Format?: No Detail Level: Zone Discontinue Regimen: tretinoin 0.025% topical cream - Apply pea size amount to full face once nightly after cleansing Initiate Treatment: Azelaic acid 15% topical gel - Apply to the affected areas of the face every morning. Follow with moisturizer\\nspironolactone 50 mg tablet - Take 1 tablet PO once daily with food and full glass of water Continue Regimen: ketoconazole 2% shampoo - Lather onto scalp and face. Let sit for 3-5mins then rinse. Follow with normal shampoo and conditioner. Use 1-2x weekly as needed on the scalp and nightly on the face.

## 2025-07-09 ENCOUNTER — OFFICE VISIT (OUTPATIENT)
Dept: DERMATOLOGY CLINIC | Facility: CLINIC | Age: 89
End: 2025-07-09
Payer: COMMERCIAL

## 2025-07-09 DIAGNOSIS — Z85.828 ENCOUNTER FOR FOLLOW-UP SURVEILLANCE OF SKIN CANCER: ICD-10-CM

## 2025-07-09 DIAGNOSIS — D22.9 MULTIPLE NEVI: ICD-10-CM

## 2025-07-09 DIAGNOSIS — L82.1 SEBORRHEIC KERATOSES: Primary | ICD-10-CM

## 2025-07-09 DIAGNOSIS — L81.4 LENTIGO: ICD-10-CM

## 2025-07-09 DIAGNOSIS — L57.0 AK (ACTINIC KERATOSIS): ICD-10-CM

## 2025-07-09 DIAGNOSIS — D23.9 BENIGN NEOPLASM OF SKIN, UNSPECIFIED LOCATION: ICD-10-CM

## 2025-07-09 DIAGNOSIS — L72.0 MILIA: ICD-10-CM

## 2025-07-09 DIAGNOSIS — Z08 ENCOUNTER FOR FOLLOW-UP SURVEILLANCE OF SKIN CANCER: ICD-10-CM

## 2025-07-09 DIAGNOSIS — T14.8XXA BRUISE: ICD-10-CM

## 2025-07-09 PROCEDURE — 1159F MED LIST DOCD IN RCRD: CPT | Performed by: DERMATOLOGY

## 2025-07-09 PROCEDURE — 1160F RVW MEDS BY RX/DR IN RCRD: CPT | Performed by: DERMATOLOGY

## 2025-07-09 PROCEDURE — G2211 COMPLEX E/M VISIT ADD ON: HCPCS | Performed by: DERMATOLOGY

## 2025-07-09 PROCEDURE — 99213 OFFICE O/P EST LOW 20 MIN: CPT | Performed by: DERMATOLOGY

## 2025-07-09 PROCEDURE — 1126F AMNT PAIN NOTED NONE PRSNT: CPT | Performed by: DERMATOLOGY

## 2025-07-09 RX ORDER — LIDOCAINE HCL 4% 4 G/100G
CREAM TOPICAL
COMMUNITY
Start: 2025-03-14

## 2025-07-09 RX ORDER — BENZYL ALCOHOL, LIDOCAINE HYDROCHLORIDE 10; 4 G/100G; G/100G
CREAM PERCUTANEOUS; TOPICAL; TRANSDERMAL
COMMUNITY
Start: 2025-03-14

## 2025-07-09 RX ORDER — NEOMYCIN SULFATE, POLYMYXIN B SULFATE, AND DEXAMETHASONE 3.5; 10000; 1 MG/G; [USP'U]/G; MG/G
OINTMENT OPHTHALMIC
COMMUNITY
Start: 2025-06-26

## 2025-07-09 NOTE — PROGRESS NOTES
The following individual(s) verbally consented to be recorded using ambient AI listening technology and understand that they can each withdraw their consent to this listening technology at any point by asking the clinician to turn off or pause the recording:    Patient name: Lyly ELIZABETH Sarahi  Additional names:  Yara Stephenson

## 2025-07-20 NOTE — PATIENT INSTRUCTIONS
VISIT SUMMARY:    Today, we discussed your concerns about skin lesions and a blocked oil gland in your left eye. You have several benign skin lesions on your scalp, leg, and neck, and a blocked oil gland in your left eye that you are managing with ointment and eyelid wipes.    YOUR PLAN:    -BENIGN KERATOSIS: Benign keratoses are non-cancerous skin growths that are common with aging. You have multiple benign keratoses on your scalp, neck, and face, which are not causing significant discomfort. To manage dryness and irritation, please use regular skin moisturization.    -POSSIBLE HEMANGIOMA OR BRUISED CAPILLARY: A hemangioma is a benign growth of blood vessels, and a bruised capillary is a small cluster of broken blood vessels. You have a spot on your leg that could be either of these. Please take a picture of the spot for future reference and monitor it for any changes in size, color, or appearance.    -BLOCKED OIL GLAND (STY): A blocked oil gland, also known as a sty, is a small, painful lump on the edge of the eyelid. You are currently managing this with ointment and eyelid wipes as advised by your eye doctor. Continue this treatment as recommended.    INSTRUCTIONS:    Please continue to monitor the spot on your leg for any changes and take a picture for future reference. Follow the treatment plan for your blocked oil gland as advised by your ophthalmologist. Use regular skin moisturization to manage dryness and irritation of your skin lesions.

## 2025-07-20 NOTE — PROGRESS NOTES
Lyly Mcclain is a 89 year old female.  HPI:     CC:    Chief Complaint   Patient presents with    Full Skin Exam     Hx of BCC, AK.  LOV 1/2025.  Pt presents for FBSE. Lesion(S) of concern to the   1. Scalp- Denies bleeding or pain  2. Left lower leg- color changes, Denies bleeding or pain  3. Neck- cryo, remains slightly raised.          Allergies:  Biaxin [clarithromycin], Celebrex [celecoxib], Neurontin [gabapentin], Nsaids, Pcn [penicillins], Radiology contrast iodinated dyes, and Bactrim [sulfamethoxazole w/trimethoprim]    HISTORY:    Past Medical History:    Abscess of tonsil    per [facility]: x2    AV node dysfunction    BCC (basal cell carcinoma of skin)    BCC (basal cell carcinoma)    right chin    Diverticulitis    Esophageal reflux    H/O cystoscopy    per [facility]: cystoscopy with bilateral retrograde pyelography 2013    High cholesterol    History of pregnancy    per [facility]: 4 vaginal deliveries    Incontinence    Mitral valve prolapse    per [facility]:     Neuropathy    fingers, feet    Osteoarthritis    Pacemaker    Parkinson disease (HCC)    Ureteropelvic junction obstruction    per [facility]:      Viral encephalitis (HCC)    per [facility]:     Visual impairment    glasses      Past Surgical History:   Procedure Laterality Date    Cardiac pacemaker placement  2008,2017    D & c      Kidney surgery Right 1963    Pacemaker/defibrillator      Total knee replacement Right 11/06/2018    DR. DURAN      Family History   Problem Relation Age of Onset    Breast Cancer Paternal Aunt 60    Heart Disease Father         CAD    Hypertension Father     Cancer Father         BCC    Cancer Mother 48        stomach cancer     Cancer Other         NO family h/o bladder or kidney cancer    Kidney Disease Other         NO family h/o urolithiasis      Social History     Socioeconomic History    Marital status:    Tobacco Use    Smoking status: Former     Current packs/day: 0.00     Average packs/day: 0.8  packs/day for 10.0 years (7.5 ttl pk-yrs)     Types: Cigarettes     Start date: 9/3/1958     Quit date: 9/3/1968     Years since quittin.9    Smokeless tobacco: Never   Vaping Use    Vaping status: Never Used   Substance and Sexual Activity    Alcohol use: Yes     Alcohol/week: 0.8 standard drinks of alcohol     Types: 1 Standard drinks or equivalent per week     Comment: Occ    Drug use: No   Other Topics Concern    Caffeine Concern Yes     Comment: decaf coffee, 1 cup     Grew up on a farm No    History of tanning Yes    Outdoor occupation No    Breast feeding No    Reaction to local anesthetic No    Pt has a pacemaker Yes    Pt has a defibrillator Yes     Social Drivers of Health     Food Insecurity: No Food Insecurity (2025)    Received from [facility]    Hunger Vital Sign     Worried About Running Out of Food in the Last Year: Never true     Ran Out of Food in the Last Year: Never true   Transportation Needs: No Transportation Needs (2025)    Received from [facility]    PRAPARE - Transportation     Lack of Transportation (Medical): No     Lack of Transportation (Non-Medical): No   Housing Stability: Unknown (2025)    Received from [facility]    Housing Stability Vital Sign     Unable to Pay for Housing in the Last Year: No     Homeless in the Last Year: No        Current Outpatient Medications   Medication Sig Dispense Refill    Lidocaine HCl-Benzyl Alcohol (SALONPAS LIDOCAINE PLUS) 4-10 % External Cream Apply 1 patch topically if needed TOPICAL      Lidocaine HCl 4 % External Cream Apply topically if needed External      neomycin-polymyxin-dexamethasone 3.5-49647-8.1 Ophthalmic Ointment APPLY TO THE EYELID LEFT TWICE A DAY X 14 DAYS      carbidopa-levodopa ER  MG Oral Tab CR Take 2 tablets by mouth nightly.      denosumab 60 MG/ML Subcutaneous Solution Prefilled Syringe Inject 1 mL (60 mg total) into the skin.       famotidine 20 MG Oral Tab Take 1 tablet (20 mg total) by mouth daily.      SODIUM FLUORIDE 5000 PPM 1.1 % Dental Paste Take 1 Bottle by mouth As Directed.      Acetaminophen (TYLENOL ARTHRITIS PAIN OR) Take 650 mg by mouth in the morning and 650 mg at noon and 650 mg in the evening.      ROSUVASTATIN 10 MG Oral Tab TAKE ONE TABLET BY MOUTH ONE TIME DAILY 90 tablet 1    Calcium Carbonate Antacid 500 MG Oral Chew Tab Chew 1 tablet (500 mg total) by mouth every 4 (four) hours as needed.  0    aspirin 81 MG Oral Tab Take 1 tablet (81 mg total) by mouth 2 (two) times daily.      melatonin 3 MG Oral Tab Take 1 tablet (3 mg total) by mouth nightly.      Senna 8.6 MG Oral Tab Take 1 tablet (8.6 mg total) by mouth nightly.      Methylcellulose, Laxative, Oral Powder Take by mouth daily.      Coenzyme Q10 (COQ10 OR) Take 20 mg by mouth daily.        carbidopa-levodopa  MG Oral Tab Take 1.5 tablets by mouth 3 (three) times daily.      Polyethylene Glycol 3350 (MIRALAX OR) Take 17 g by mouth daily.        Multiple Vitamin (MULTI-VITAMINS) Oral Tab Take 1 tablet by mouth daily.      ondansetron 4 MG Oral Tablet Dispersible Take 1 tablet (4 mg total) by mouth every 8 (eight) hours as needed for Nausea. (Patient not taking: Reported on 7/9/2025) 6 tablet 0    Imiquimod 5 % External Cream Apply topically  1/2 packet 2 times every week to right chest biopsy site x 6 weeks. This may cause redness and crusting. flu-like symptoms may occur with excess application. (Patient not taking: Reported on 1/3/2025) 12 each 0    Pantoprazole Sodium 40 MG Oral Tab EC Take 1 tablet (40 mg total) by mouth every morning before breakfast. (Patient not taking: Reported on 7/9/2025)  0    docusate sodium 100 MG Oral Cap Take 100 mg by mouth 2 (two) times daily. (Patient not taking: Reported on 1/3/2025) 20 capsule 0    carbidopa-levodopa  MG Oral Tablet Dispersible Take 0.5 tablets by mouth as needed.      Misc Natural Products (OSTEO  BI-FLEX ADV DOUBLE ST OR) Take 2 tablets by mouth daily.   (Patient not taking: Reported on 2025)       Allergies:   Allergies   Allergen Reactions    Biaxin [Clarithromycin] RASH    Celebrex [Celecoxib] OTHER (SEE COMMENTS)     Abdominal pain    Neurontin [Gabapentin] OTHER (SEE COMMENTS)     shaky    Nsaids OTHER (SEE COMMENTS)     Abdominal pain    Pcn [Penicillins] RASH    Radiology Contrast Iodinated Dyes     Bactrim [Sulfamethoxazole W/Trimethoprim] DIARRHEA       Past Medical History:    Abscess of tonsil    per [facility]: x2    AV node dysfunction    BCC (basal cell carcinoma of skin)    BCC (basal cell carcinoma)    right chin    Diverticulitis    Esophageal reflux    H/O cystoscopy    per [facility]: cystoscopy with bilateral retrograde pyelography     High cholesterol    History of pregnancy    per [facility]: 4 vaginal deliveries    Incontinence    Mitral valve prolapse    per [facility]:     Neuropathy    fingers, feet    Osteoarthritis    Pacemaker    Parkinson disease (HCC)    Ureteropelvic junction obstruction    per Next[facility]:      Viral encephalitis (HCC)    per [facility]:     Visual impairment    glasses     Past Surgical History:   Procedure Laterality Date    Cardiac pacemaker placement  ,    D & c      Kidney surgery Right 1963    Pacemaker/defibrillator      Total knee replacement Right 2018    DR. DRUAN     Social History     Socioeconomic History    Marital status:      Spouse name: Not on file    Number of children: Not on file    Years of education: Not on file    Highest education level: Not on file   Occupational History    Not on file   Tobacco Use    Smoking status: Former     Current packs/day: 0.00     Average packs/day: 0.8 packs/day for 10.0 years (7.5 ttl pk-yrs)     Types: Cigarettes     Start date: 9/3/1958     Quit date: 9/3/1968     Years since quittin.9    Smokeless tobacco: Never   Vaping Use    Vaping status: Never Used   Substance and Sexual Activity     Alcohol use: Yes     Alcohol/week: 0.8 standard drinks of alcohol     Types: 1 Standard drinks or equivalent per week     Comment: Occ    Drug use: No    Sexual activity: Not on file   Other Topics Concern     Service Not Asked    Blood Transfusions Not Asked    Caffeine Concern Yes     Comment: decaf coffee, 1 cup     Occupational Exposure Not Asked    Hobby Hazards Not Asked    Sleep Concern Not Asked    Stress Concern Not Asked    Weight Concern Not Asked    Special Diet Not Asked    Back Care Not Asked    Exercise Not Asked    Bike Helmet Not Asked    Seat Belt Not Asked    Self-Exams Not Asked    Grew up on a farm No    History of tanning Yes    Outdoor occupation No    Breast feeding No    Reaction to local anesthetic No    Pt has a pacemaker Yes    Pt has a defibrillator Yes   Social History Narrative    Not on file     Social Drivers of Health     Food Insecurity: No Food Insecurity (1/6/2025)    Received from [facility]    Hunger Vital Sign     Worried About Running Out of Food in the Last Year: Never true     Ran Out of Food in the Last Year: Never true   Transportation Needs: No Transportation Needs (1/6/2025)    Received from [facility]    PRAPARE - Transportation     Lack of Transportation (Medical): No     Lack of Transportation (Non-Medical): No   Stress: Not on file   Housing Stability: Unknown (1/6/2025)    Received from [facility]    Housing Stability Vital Sign     Unable to Pay for Housing in the Last Year: No     Number of Times Moved in the Last Year: Not on file     Homeless in the Last Year: No     Family History   Problem Relation Age of Onset    Breast Cancer Paternal Aunt 60    Heart Disease Father         CAD    Hypertension Father     Cancer Father         BCC    Cancer Mother 48        stomach cancer     Cancer Other         NO family h/o bladder or kidney cancer    Kidney Disease Other         NO family h/o  urolithiasis       There were no vitals filed for this visit.    HPI:    Chief Complaint   Patient presents with    Full Skin Exam     Hx of BCC, AK.  LOV 1/2025.  Pt presents for FBSE. Lesion(S) of concern to the   1. Scalp- Denies bleeding or pain  2. Left lower leg- color changes, Denies bleeding or pain  3. Neck- cryo, remains slightly raised.    Patient here with her daughter.  Several concerns in particular new lesion on the lateral neck.  Generally has been in stable health.    Follow-up BCC right chin post E&C10/21    Past notes/ records and appropriate/relevant lab results including pathology and past body maps reviewed. Updated and new information noted in current visit.     As noted previously patient previously had worked with Dr. Lola Middleton.  Patient had several removals in the past.  No personal family history of skin cancer.  Multiple underlying health conditions as noted including Parkinson's.  Concern regarding multiple skin lesions and elevated risk of skin cancer in Parkinson's disease .  Patient's father with history of BCC  Concern with multiple lesions over the back and arms       History of Present Illness  Lyly Mcclain is an 89 year old female who presents with concerns about skin lesions and a blocked oil gland in the left eye.    She has several skin lesions on her scalp, leg, and neck. There is itching in her scalp 'all over' but no significant discomfort from the lesions. A previous procedure on her neck involved removal or freezing of a lesion, which remains but is not bothersome. A spot on her face sometimes itches but is not irritating.    A new spot on her leg is described as a 'little bruised like capillary thing'. She is unsure if it is new or has been there for a while.    She has milia due to dryness and irritation, which are not scaly or flaky. A hair on her scalp bothers her peripheral vision, but it is not visible unless closely inspected.    She recently visited an  eye doctor for a blocked oil gland in her left eye, which started as a sty. She is using ointment and eyelid wipes to manage the condition.    No significant itching or irritation from the skin lesions, except for occasional itching in the scalp and a benign keratosis on the face. No flakiness or scaliness of the skin.      patient presents with concerns above.    Patient has been in their usual state of health.  History, medications, allergies reviewed as noted.      ROS:  Denies any other systemic complaints.  No new or changeing lesions other than noted above. No fevers, chills, night sweats, unusual sun sensitivity.  No other skin complaints.        History, medications, allergies reviewed as noted.       Physical Examination:     Well-developed well-nourished patient alert oriented in no acute distress.  Exam performed of appropriate involved areas     multiple light to medium brown, well marginated, uniformly pigmented, macules and papules 6 mm and less scattered on exam. pigmented lesions examined with dermoscopy benign-appearing patterns.     Waxy tannish keratotic papules scattered, cherry-red vascular papules scattered.    See map today's date for lesions noted .      Otherwise remarkable for lesions as noted on map.  See details of examination  See Assessment /Plan for additional history and physical exam also:    Assessment / plan:    No orders of the defined types were placed in this encounter.      Meds & Refills for this Visit:  Requested Prescriptions      No prescriptions requested or ordered in this encounter         Encounter Diagnoses   Name Primary?    Seborrheic keratoses Yes    AK (actinic keratosis)     Lentigo     Benign neoplasm of skin, unspecified location     Encounter for follow-up surveillance of skin cancer     Multiple nevi        See details on map.      Remarkable for:    Fall risk assessment:  Given the results of the fall risk questionnaire given today.   Suggest:   Make sure  there is adequate lighting.  Eliminate throw rugs or possible sources of tripping & falling  Consider grab bars on the shower & toilet.  Hand rails on all stair cases  Ambulatory assistance devices such as cane or walker as indicate.  To ensure home safety measures.      Patient seen for follow-up long-term monitoring, treatment of  Multiple skin cancers actinic keratoses, sun damage, patient at increased risk of skin cancer given Parkinson's disease more careful monitoring required.  Patient with history of pacemaker, as well.  Plan of care:  ongoing surveillance, monitoring including regular follow-up due to longer term risk of recurrence, new lesions.  See previous notes.      Physical Exam  SKIN: Benign keratosis on scalp and face. Possible hemangioma or bruise on leg. Milia on skin. Bruised capillary on leg.    Results  DIAGNOSTIC  Eye examination: Hordeolum in the left eye    Assessment & Plan  Benign keratosis  Multiple benign keratoses on the scalp, neck, and face, age-related and not bothersome. Some itching on the scalp, lesions appear benign.  - Advise regular skin moisturization to manage dryness and irritation.    Possible hemangioma or bruised capillary  Spot on the leg, possibly a hemangioma or bruised capillary, appears as a small, bruised-like capillary cluster. No immediate concern.  - Take a picture of the spot for future reference.  - Monitor for changes in size, color, or appearance.    Blocked oil gland (sty)  Blocked oil gland in the left eye, resembling a sty, managed with ointment and eyelid wipes. Discussion about Demodex mites contributing to the condition.  - Continue treatment with ointment and eyelid wipes as advised by the ophthalmologist.      On examination multiple whitish dome-shaped smooth papules are noted.  Milia diagnosis , pathophysiology discussed.  Over-the-counter retinol products may cause peeling irritation.  Consider prescription Retin-A if worsening.      Recording  duration: 11 minutes            Continue compression stockings, monitoring lower extremities for stasis changes  Appears to be a small bruise above  - Right chest BCC nodular post imiquimod has healed well no recurrence    History BCC right chin post electrodesiccation curettage 10/21 well-healed no evidence recurrence.    No active AK's  Actinic Keratoses.  Precancerous nature discussed. Sun protection, sunscreen/ blocks encouraged .  Monitoring for new lesions.  Sun damage additional recurrent and new actinic keratoses, skin cancers may occur in areas of prior actinic keratoses, related to past sun exposure to minimize current sun exposure.  Sunscreen applied consistently regularly, reapplication and sun protection while in the car recommended.    SK left posterior leg reassurance    Multiple benign keratoses scattered over the face, in particular lesion at left lateral neck reassurance.  Risk of additional skin cancers with her Parkinson's discussed.  Continue careful monitoring sun protection    Waxy tan papule at right brow, lentigines, keratoses over the arms hands back tan papule consistent with SK at lower leg as well  Waxy tan keratotic papules lesions in areas of concern as noted reassurance given.  Benign nature discussed.  Possibility of cryo, alphahydroxy acids over-the-counter retinol's discussed.    Many lesions along the chest back arms  .  Along the central chest, mid abdomen more stucco like keratoses   More prominent stasis changes over the feet and ankles reassurance monitor moisturizer, manage edema.  Improved continue careful monitoring  Overall patient doing well  Recheck 6 months or as needed  Reassurance around benign seborrheic keratoses over the back chest arms reassurance given.  Consider cryo to more bothersome lesions.    Moderate sun damage scattered lentigines ephelides continue careful sun protection.  No suspicious lesions currently.  With history of Parkinson's disease, BCC AK's  more consistent follow-up in 6 months or as needed    No other suspicious lesions currently    Discussed alphahydroxy acid lotions for stucco keratoses, more inflamed keratoses such as AmLactin or [place] psoriasis cream  Please refer to map for specific lesions.  See additional diagnoses.  Pros cons of various therapies, risks benefits discussed.Pathophysiology discussed with patient.  Therapeutic options reviewed.  See  Medications in grid.  Instructions reviewed at length.    Benign nevi, seborrheic  keratoses, cherry angiomas:  Reassurance regarding other benign skin lesions.    General skin care questions answered.   Reassurance regarding benign skin lesions.    Monitor for new or changing lesions. Signs and symptoms of skin cancer, ABCDE's of melanoma ( additional information available at AAD.org, skincancer.org) Encourage Sunscreen (broad-spectrum, ideally mineral-based-UVA/UVB -SPF 30 or higher) use encouraged, sun protection/sun protective clothing, self exams reviewed Followup as noted RTC ---routine checkup 6 mos -one year or p.r.n.    Encounter Times   Including precharting, reviewing chart, prior notes obtaining history: 10 minutes, medical exam :10 minutes, notes on body map, plan, counseling 10minutes My total time spent caring for the patient on the day of the encounter: 30 minutes     The patient indicates understanding of these issues and agrees to the plan.  The patient is asked to return as noted in follow-up/ above.    This note was generated using Dragon voice recognition software.  Please contact me regarding any confusion resulting from errors in recognition..  Note to patient and family: The 21st Century Cures Act makes medical notes like these available to patients. However, be advised this is a medical document. It is intended as [id number] communication and monitoring of a patient's care needs. It is written in medical language and may contain abbreviations or verbiage that are  unfamiliar. It may appear blunt or direct. Medical documents are intended to carry relevant information, facts as evident and the clinical opinion of the practitioner.

## 2025-08-23 ENCOUNTER — HOSPITAL ENCOUNTER (EMERGENCY)
Facility: HOSPITAL | Age: 89
Discharge: HOME | End: 2025-08-23
Attending: EMERGENCY MEDICINE

## 2025-08-23 VITALS
RESPIRATION RATE: 16 BRPM | BODY MASS INDEX: 25.69 KG/M2 | OXYGEN SATURATION: 91 % | WEIGHT: 145 LBS | HEIGHT: 63 IN | DIASTOLIC BLOOD PRESSURE: 78 MMHG | TEMPERATURE: 99 F | SYSTOLIC BLOOD PRESSURE: 137 MMHG | HEART RATE: 86 BPM

## 2025-08-23 DIAGNOSIS — L03.213 PERIORBITAL CELLULITIS, UNSPECIFIED LATERALITY: ICD-10-CM

## 2025-08-23 DIAGNOSIS — H10.33 ACUTE CONJUNCTIVITIS OF BOTH EYES, UNSPECIFIED ACUTE CONJUNCTIVITIS TYPE: Primary | ICD-10-CM

## 2025-08-23 LAB
ANION GAP SERPL CALC-SCNC: 6 MMOL/L (ref 0–18)
BASOPHILS # BLD AUTO: 0.05 X10(3) UL (ref 0–0.2)
BASOPHILS NFR BLD AUTO: 0.5 %
BUN BLD-MCNC: 17 MG/DL (ref 9–23)
BUN/CREAT SERPL: 23 (ref 10–20)
CALCIUM BLD-MCNC: 9.3 MG/DL (ref 8.7–10.4)
CHLORIDE SERPL-SCNC: 98 MMOL/L (ref 98–112)
CO2 SERPL-SCNC: 30 MMOL/L (ref 21–32)
CREAT BLD-MCNC: 0.74 MG/DL (ref 0.55–1.02)
DEPRECATED RDW RBC AUTO: 48.3 FL (ref 35.1–46.3)
EGFRCR SERPLBLD CKD-EPI 2021: 78 ML/MIN/1.73M2 (ref 60–?)
EOSINOPHIL # BLD AUTO: 0.13 X10(3) UL (ref 0–0.7)
EOSINOPHIL NFR BLD AUTO: 1.3 %
ERYTHROCYTE [DISTWIDTH] IN BLOOD BY AUTOMATED COUNT: 13.5 % (ref 11–15)
FLUAV + FLUBV RNA SPEC NAA+PROBE: NEGATIVE
FLUAV + FLUBV RNA SPEC NAA+PROBE: NEGATIVE
GLUCOSE BLD-MCNC: 81 MG/DL (ref 70–99)
HCT VFR BLD AUTO: 32.6 % (ref 35–48)
HGB BLD-MCNC: 11.2 G/DL (ref 12–16)
IMM GRANULOCYTES # BLD AUTO: 0.04 X10(3) UL (ref 0–1)
IMM GRANULOCYTES NFR BLD: 0.4 %
LYMPHOCYTES # BLD AUTO: 1.16 X10(3) UL (ref 1–4)
LYMPHOCYTES NFR BLD AUTO: 11.3 %
MCH RBC QN AUTO: 33.7 PG (ref 26–34)
MCHC RBC AUTO-ENTMCNC: 34.4 G/DL (ref 31–37)
MCV RBC AUTO: 98.2 FL (ref 80–100)
MONOCYTES # BLD AUTO: 1.33 X10(3) UL (ref 0.1–1)
MONOCYTES NFR BLD AUTO: 13 %
NEUTROPHILS # BLD AUTO: 7.55 X10 (3) UL (ref 1.5–7.7)
NEUTROPHILS # BLD AUTO: 7.55 X10(3) UL (ref 1.5–7.7)
NEUTROPHILS NFR BLD AUTO: 73.5 %
OSMOLALITY SERPL CALC.SUM OF ELEC: 279 MOSM/KG (ref 275–295)
PLATELET # BLD AUTO: 220 10(3)UL (ref 150–450)
POTASSIUM SERPL-SCNC: 4.3 MMOL/L (ref 3.5–5.1)
RBC # BLD AUTO: 3.32 X10(6)UL (ref 3.8–5.3)
RSV RNA SPEC NAA+PROBE: NEGATIVE
SARS-COV-2 RNA RESP QL NAA+PROBE: NOT DETECTED
SODIUM SERPL-SCNC: 134 MMOL/L (ref 136–145)
WBC # BLD AUTO: 10.3 X10(3) UL (ref 4–11)

## 2025-08-23 PROCEDURE — 87637 SARSCOV2&INF A&B&RSV AMP PRB: CPT | Performed by: EMERGENCY MEDICINE

## 2025-08-23 PROCEDURE — 87637 SARSCOV2&INF A&B&RSV AMP PRB: CPT

## 2025-08-23 PROCEDURE — 80048 BASIC METABOLIC PNL TOTAL CA: CPT | Performed by: EMERGENCY MEDICINE

## 2025-08-23 PROCEDURE — 85025 COMPLETE CBC W/AUTO DIFF WBC: CPT | Performed by: EMERGENCY MEDICINE

## 2025-08-23 RX ORDER — METRONIDAZOLE 500 MG/1
500 TABLET ORAL 3 TIMES DAILY
Qty: 21 TABLET | Refills: 0 | Status: SHIPPED | OUTPATIENT
Start: 2025-08-23 | End: 2025-08-30

## 2025-08-23 RX ORDER — FLUORESCEIN SODIUM 1 MG/MG
1 STRIP OPHTHALMIC ONCE
Status: COMPLETED | OUTPATIENT
Start: 2025-08-23 | End: 2025-08-23

## 2025-08-23 RX ORDER — GENTAMICIN SULFATE 3 MG/ML
2 SOLUTION/ DROPS OPHTHALMIC
Qty: 5 ML | Refills: 0 | Status: SHIPPED | OUTPATIENT
Start: 2025-08-23 | End: 2025-08-28

## 2025-08-23 RX ORDER — CEFUROXIME AXETIL 500 MG/1
500 TABLET ORAL 2 TIMES DAILY
Qty: 14 TABLET | Refills: 0 | Status: SHIPPED | OUTPATIENT
Start: 2025-08-23 | End: 2025-08-30

## 2025-08-23 RX ORDER — TETRACAINE HYDROCHLORIDE 5 MG/ML
1 SOLUTION OPHTHALMIC ONCE
Status: COMPLETED | OUTPATIENT
Start: 2025-08-23 | End: 2025-08-23

## 2025-08-23 RX ORDER — PURIFIED WATER 986 MG/ML
1 SOLUTION OPHTHALMIC AS NEEDED
Status: DISCONTINUED | OUTPATIENT
Start: 2025-08-23 | End: 2025-08-23

## (undated) DEVICE — 3M™ STERI-DRAPE™ U-DRAPE 1015: Brand: STERI-DRAPE™

## (undated) DEVICE — Device

## (undated) DEVICE — BANDAGE FLXMSTR 11YDX6IN STRL

## (undated) DEVICE — STERILE POLYISOPRENE POWDER-FREE SURGICAL GLOVES: Brand: PROTEXIS

## (undated) DEVICE — T5 HOOD WITH PEEL AWAY FACE SHIELD

## (undated) DEVICE — SHORT THREADED PINS PACK: Brand: KNEE INSTRUMENTS

## (undated) DEVICE — POLAR CARE CUBE COOLING UNIT

## (undated) DEVICE — CEMENT MIXING SYSTEM WITH FEMORAL BREAKWAY NOZZLE: Brand: REVOLUTION

## (undated) DEVICE — CT RIGHT MEDIAL #3: Brand: MY KNEE TIBIAL CUTTING BLOCKS

## (undated) DEVICE — SUTURE VICRYL 2-0 FS-1

## (undated) DEVICE — MYKNEE TIBIAL BONE MODEL LEFT: Brand: MY KNEE BONE MODELS

## (undated) DEVICE — 2T11 #2 PDO 36 X 36: Brand: 2T11 #2 PDO 36 X 36

## (undated) DEVICE — DERMABOND LIQUID ADHESIVE

## (undated) DEVICE — DRAPE SHEET LG

## (undated) DEVICE — CONTAINER SPEC STR 4OZ GRY LID

## (undated) DEVICE — MYKNEE FEMUR BONE MODEL LEFT: Brand: MY KNEE BONE MODELS

## (undated) DEVICE — BATTERY

## (undated) DEVICE — CHLORAPREP 26ML APPLICATOR

## (undated) DEVICE — STERILE LATEX POWDER-FREE SURGICAL GLOVES WITH HYDROGEL COATING, SMOOTH FINISH, STRAIGHT FINGER: Brand: PROTEXIS

## (undated) DEVICE — GAUZE SPONGES,12 PLY: Brand: CURITY

## (undated) DEVICE — VIOLET BRAIDED (POLYGLACTIN 910), SYNTHETIC ABSORBABLE SUTURE: Brand: COATED VICRYL

## (undated) DEVICE — NEEDLE SPINAL 20X3-1/2 YELLOW

## (undated) DEVICE — TOTAL KNEE: Brand: MEDLINE INDUSTRIES, INC.

## (undated) DEVICE — GAMMEX® PI HYBRID SIZE 9, STERILE POWDER-FREE SURGICAL GLOVE, POLYISOPRENE AND NEOPRENE BLEND: Brand: GAMMEX

## (undated) DEVICE — 60 ML SYRINGE LUER-LOCK TIP: Brand: MONOJECT

## (undated) DEVICE — DUAL CUT SAGITTAL BLADE

## (undated) DEVICE — MYKNEE FEMDISCUTBL-CT-GMK-RM-#3+: Brand: MYKNEE

## (undated) DEVICE — SOL  .9 3000ML

## (undated) DEVICE — BANDAGE ROLL,100% COTTON, 6 PLY, LARGE: Brand: KERLIX

## (undated) DEVICE — SCREWS PACK: Brand: KNEE INSTRUMENTS

## (undated) DEVICE — GAMMEX® NON-LATEX PI ORTHO SIZE 9, STERILE POLYISOPRENE POWDER-FREE SURGICAL GLOVE: Brand: GAMMEX

## (undated) DEVICE — MYKNEE TIBIAL BONE MODEL RIGHT
Type: IMPLANTABLE DEVICE
Brand: MY KNEE BONE MODELS

## (undated) DEVICE — 2DE14 2-0 PDO 24 X 24: Brand: 2DE14 2-0 PDO 24 X 24

## (undated) DEVICE — SMOOTH PINS PACK: Brand: KNEE INSTRUMENTS

## (undated) DEVICE — GAMMEX® NON-LATEX PI ORTHO SIZE 8.5, STERILE POLYISOPRENE POWDER-FREE SURGICAL GLOVE: Brand: GAMMEX

## (undated) DEVICE — DRESSING BORDER AQUACEL 4X10

## (undated) DEVICE — PAD THRP 16IN WRPON MU LNG STM

## (undated) DEVICE — ZIMMER® STERILE DISPOSABLE TOURNIQUET CUFF WITH PLC, DUAL PORT, SINGLE BLADDER, 34 IN. (86 CM)

## (undated) DEVICE — BLADE SAW SAGITTAL 19.5

## (undated) DEVICE — COTTON ROLL: Brand: DEROYAL

## (undated) DEVICE — MYKNEE FEMUR BONE MODEL RIGHT
Type: IMPLANTABLE DEVICE
Brand: MY KNEE BONE MODELS

## (undated) NOTE — IP AVS SNAPSHOT
[facility] [place]            (For Outpatient Use Only) Initial Admit Date: 11/6/2018   Inpt/Obs Admit Date: Inpt: 11/6/18 / Obs: N/A   Discharge Date:    Skip Stefania:  [de-identified]   MRN: [de-identified]   CSN: [id number]        ENCOUNTER  Patient Class Subscriber Name:  Valentino Kwon :    Subscriber ID:  Pt Rel to Subscriber:    Hospital Account Financial Class: Medicare    2018

## (undated) NOTE — LETTER
Hospital Discharge Documentation  Pt was dc to  [address]      From: [address] Hospitalist's Office  Phone: [phone]    Patient discharged time/date: 11/9/2018  2:37 PM  Patient discharge disposition:  SNF       Discharge Summary - D Pulmonary:  clear to auscultation bilaterally  Cardiovascular: S1, S2 normal, no murmur, click, rub or gallop, regular rate and rhythm  Abdominal: soft, non-tender; bowel sounds normal; no masses,  no organomegaly  Extremities: extremities normal, atraumat Take 1 capsule (300 mg total) by mouth nightly. Quantity:  10 capsule  Refills:  0     HYDROcodone-acetaminophen 5-325 MG Tabs  Commonly known as:  NORCO      Take 1 tablet by mouth every 4 (four) hours as needed.    Quantity:  50 tablet  Refills:  0 Take 1 tablet by mouth 3 (three) times daily. Refills:  0     CITRUCEL 500 MG Tabs  Generic drug:  Methylcellulose (Laxative)      Take 500 mg by mouth daily. Refills:  0     COQ10 OR      Take 20 mg by mouth.    Refills:  0     OSTEO BI-FLEX ADV DOUBL

## (undated) NOTE — IP AVS SNAPSHOT
[facility]            (For Outpatient Use Only) Initial Admit Date: 9/10/2019   Inpt/Obs Admit Date: Inpt: 9/10/19 / Obs: N/A   Discharge Date:    Salina Richmond:  [de-identified]   MRN: [de-identified]   CSN: [id number]   CEID: [id number]        NATHALY Subscriber Name:  Tony Olivera :    Subscriber ID:  Pt Rel to Subscriber:    Hospital Account Financial Class: Medicare    2019

## (undated) NOTE — IP AVS SNAPSHOT
[address]  [address] [facility] [phone]                Discharge Summary   3/1/2017    [facility]lando Puckett [facility]           Admission Information        Provider Department    3/1/2017 Sarahi Shankar MD [facility] Apply 0.5 mg topically twice a week. Apply vaginally 2xweek      [    ]    [    ]    [    ]    [    ]       KP MELATONIN 3 MG Tabs   Generic drug:  melatonin        Take 3 mg by mouth nightly.       [    ]    [    ]    [    ]    [    ]       Magdalene Ferrer DUE TO SEDATION FOR 24 HOURS-NO DRIVING ALCOHOL OR OPERATING MACHINERY    NO STRENUOUS ACTIVITY OR LIFTING ABOVE 10 POUNDS FOR ONE WEEK    Future Appointments     Mar 07, 2017  1:30 PM   DIAGNOSTIC PROCEDURE with [facility]     None         Additional Information       We are concerned for your overall well being:    - If you are a smoker or have smoked in the last 12 months, we encourage you to explore options for quitting.     - If you have concerns related review your medications with you before you are discharged, and can provide you with additional printed information. Not all patients will experience these side effects or respond to medications the same.  Please call your provider or healthcare team if you nausea/vomiting, somnolence   What to report to your healthcare team: Dizziness, Somnolence, Weakness, Headache, Nausea/vomiting           All Other Medications     Trospium Chloride ER 60 MG Oral Capsule SR 24 Hr    Estradiol 10 % Does not apply Cream

## (undated) NOTE — LETTER
Hospital Discharge Documentation  Pt was discharged to [facility] at [phone]    From: [facility] Hospitalist's Office  Phone: [phone]    Patient discharged time/date: 9/13/2019 11:45 AM  Patient discharge disposition:  SNF       Discharge Cardiovascular: S1, S2 normal, no murmur, click, rub or gallop, regular rate and rhythm  Abdominal: soft, non-tender; bowel sounds normal; no masses,  no organomegaly  Extremities: extremities normal, atraumatic, no cyanosis or edema  Psychiatric: calm Take 1 tablet (50 mg total) by mouth every 6 (six) hours as needed.         CHANGE how you take these medications    aspirin 81 MG Tabs  What changed:  when to take this        CONTINUE taking these medications    * carbidopa-levodopa  MG Tbdp  Common Discharge Summary signed by Shazia Bartholomew MD at 9/13/2019  9:36 AM  Version 1 of 2    Author:  Shazia Bartholomew MD Service:  Hospitalist Author Type:  Physician    Filed:  9/13/2019  9:36 AM Date of Service:  9/13/2019  8:12 AM Status:  Signed     Ama Obrien is a(n) 81year old female. She is an established patient of ours who we have been treating her left knee arthritis conservatively. We last saw her in April. At that time we gave her a cortisone injection.  She stated that the cortisone o Commonly known as:  ULTRAM      Take 1 tablet (50 mg total) by mouth every 6 (six) hours as needed.    Quantity:  10 tablet  Refills:  0        CHANGE how you take these medications      Instructions Prescription details   aspirin 325 MG Tabs  What changed: · HYDROcodone-acetaminophen 5-325 MG Tabs  · traMADol HCl 50 MG Tabs         Follow up Visits:  Follow-up with[FG.1] PCP[FG.2] in 1 week[FG.1] after discharge from rehab[FG.2]    Follow up Labs:[FG.1] n/a[FG.2]     Other Discharge Instructions:[FG.1] orthop

## (undated) NOTE — IP AVS SNAPSHOT
Patient Demographics     Address  [facility] [address] [address] Phone  [phone] [facility]) E-mail Address  [email or web address]      Emergency Contact(s)     Name Relation Home Work [address] [facility] Daughter [phone]      Heribertotimur Douglas 8 ASHLEY ALVAREZ PA-C         CITRUCEL 500 MG Tabs  Generic drug:  Methylcellulose (Laxative)      Take 500 mg by mouth daily. COQ10 OR      Take 20 mg by mouth.           docusate sodium 100 MG Caps  Commonly known as:  Felicia Please  your prescriptions at the location directed by your doctor or nurse    Bring a paper prescription for each of these medications  aspirin 325 MG Tabs  celecoxib 200 MG Caps  docusate sodium 100 MG Caps  ferrous sulfate 325 (65 FE) MG Tbec  ga Vitals  134/53 Filed at 11/09/2018 0500   Pulse  75 Filed at 11/09/2018 0500   Resp  18 Filed at 11/09/2018 0500   Temp  97.8 °F (36.6 °C) Filed at 11/09/2018 0500   SpO2  93 % Filed at 11/09/2018 0500      Patient's Most Recent Weight       Most Recent Va MCV 94.2 80.0 - 100.0 fL — [facility]   MCH 32.1 27.0 - 32.0 pg H [facility]   MCHC 34.1 32.0 - 37.0 g/dl — [facility]   RDW 14.0 11.0 - 15.0 % — [facility]    140 - 400 K/UL — [facility]   MPV 8.4 7.4 - 10.3 fL Donald Ruggiero            C Right hip pain    HPI:   Rina Castro is a(n) 81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 She has mild Parkinson's disease.  She has a great deal of trouble walking u Smoking status: Former Smoker        Packs/day: 0.75        Years: 10.00        Pack years: 7.5        Types: Cigarettes      Smokeless tobacco: Never Used    Alcohol use:  Yes      Alcohol/week: 0.5 oz      Types: 1 Standard drinks or equivalent per we talked about the options for treatment including surgical and nonsurgical management. She's been contemplating having her knee replaced for some time.  Given her age, early onset of Parkinson's, and the degree of arthritis we think this is a reasonable opti able to ambulate X12' with RW, Min A and close chair follow with distance limited by fatigue and slight dizziness. Pt left in chair with alarm on and all needs in reach. Educated pt and her daughter on PT POC, safety, activity and d/c recs.  Pt will giuseppe Standardized Score (AM-PAC Scale): 40.78   CMS Modifier (G-Code): CK    FUNCTIONAL ABILITY STATUS  Gait Assessment   Gait Assistance: Minimum assistance; Moderate assistance  Distance (ft): X4 ft; X12(with chair follow)  Assistive Device: Rolling walker  Pa Attribution [employer]    MD.1 - Niko Puckett PT on 11/8/2018  5:17 PM               Physical Therapy Note signed by Niko Puckett PT at 11/8/2018 11:54 AM  Version 1 of 1    Author:  Niko Puckett PT Service:  Rehab Author Type:  Physical The OBJECTIVE  Precautions: Limb alert - left;Bed/chair alarm    WEIGHT BEARING STATUS  Weight Bearing Restriction: R lower extremity        R Lower Extremity: Weight Bearing as Tolerated       PAIN ASSESSMENT   Ratin(with activity; 0/10 at rest)  Location Glut Sets     Hip Abd/Add 12 reps    Heel slides 12 reps    Saq 12 reps    SLR 12 reps    Sitting Knee Flexion     Standing heel/toe raises     Standing knee flexion     Extension stretch  5X        Knee ROM   R Knee Flexion (degrees): 80(actively; 85 pass PHYSICAL THERAPY KNEE TREATMENT NOTE - INPATIENT     Room Number: 442/442-A[KS. 1]             Presenting Problem: R TKA[KS. 2]    Problem List[KS. 1]  Principal Problem:    Osteoarthritis of right knee  Active Problems:    Dyslipidemia    Parkinson disease ( cognition . Pt taking tyelnol only for pain . Pt with moderate pain with mobility and ambulation . Pain does limit tolerance for mobility . Pt pain at right knee sx site. Pt with hx of PD on admission.    Pt with slower motor processing observed and does progress with therapy and further acute management pending progress . D/c plans for JACOB when pt medically stable.[KS.3]       DISCHARGE RECOMMENDATIONS[KS. 1]  PT Discharge Recommendations: 24 hour care/supervision;Sub-acute rehabilitation[KS. 2]    PLAN[K wheelchair, bedside commode, etc.): A Little   -   Moving from lying on back to sitting on the side of the bed?: A Little[KS. 2]   How much help from another person does the patient currently need. ..[KS.1]   -   Moving to and from a bed to a chair (includin Goal #1   Current Status  [KS. 1]min assist[KS. 3]    Goal #2 Patient is able to demonstrate transfers Sit to/from Stand at assistance level: SBA      Goal #2  Current Status  [KS. 1]min assist[KS. 3]      Goal #3 Patient is able to ambulate 300 feet with assi Rehabilitation Author Type:  Physical Therapist    Filed:  11/6/2018  5:37 PM Date of Service:  11/6/2018  5:26 PM Status:  Signed    :  Joye Goodpasture, PT (Physical Therapist)       PHYSICAL THERAPY KNEE EVALUATION - INPATIENT       Room Number: 4 Expect patient to make progress toward IP PT goals in the next few sessions. Future sessions to focus on increasing ambulation with transition to JACOB as pt lives alone.     Patient will benefit from continued IP PT services to address these deficits in prep SUBJECTIVE  \"I'm just very tired\"    PHYSICAL THERAPY EXAMINATION     OBJECTIVE  Precautions: Limb alert - right  Fall Risk: Standard fall risk    WEIGHT BEARING RESTRICTION  Weight Bearing Restriction: R lower extremity        R Lower Extremity: Weight Standardized Score (AM-PAC Scale): 42.13   CMS Modifier (G-Code): CK    FUNCTIONAL ABILITY STATUS  Gait Assessment   Gait Assistance: Minimum assistance  Distance (ft): 5ft  Assistive Device: Rolling walker  Pattern: Shuffle;R Decreased stance time  Stoop/ Filed:  11/9/2018  1:36 PM Date of Service:  11/9/2018 10:28 AM Status:  Signed    :  Sajan Rodriguez OT (Occupational Therapist)       OCCUPATIONAL THERAPY TREATMENT NOTE - INPATIENT    Room Number: 442/442-A         Presenting Problem: (R How much help from another person does the patient currently need…  -   Putting on and taking off regular lower body clothing?: A Lot  -   Bathing (including washing, rinsing, drying)?: A Little  -   Toileting, which includes using toilet, bedpan or urinal Evaluation Date: 11/7/2018  Type of Evaluation: Initial  Presenting Problem: (RT TKA)    Physician Order: IP Consult to Occupational Therapy  Reason for Therapy: ADL/IADL Dysfunction and Discharge Planning    OCCUPATIONAL THERAPY ASSESSMENT     Patient is per NextGen:    • Neuropathy     fingers, feet   • Osteoarthritis    • Pacemaker    • Parkinson disease ([facility] [address]    • Ureteropelvic junction obstruction 1963    per NextGen:     • Viral encephalitis     per NextGen:        Past Surgical History  Past Surgica Standardized Score (AM-PAC Scale): 44.27  CMS Modifier (G-Code): CJ    FUNCTIONAL TRANSFER ASSESSMENT  Supine to Sit : Minimum assistance  Sit to Stand: Minimum assistance  Toilet Transfer: CGA  Shower Transfer: NT  Chair Transfer: CGA  Car Transfer: NT

## (undated) NOTE — IP AVS SNAPSHOT
Patient Demographics     Address  [facility] [address] [address] Phone  [phone] [facility]) E-mail Address  [email or web address]      Emergency Contact(s)     Name Relation Home Work [address] Daughter [phone]      Rae Turner Commonly known as:  SINEMET  Next dose due:  TONIGHT       Take 1.5 tablets by mouth 3 (three) times daily. CITRUCEL Powd  Generic drug:  Methylcellulose (Laxative)  Next dose due:  TOMORROW MORNING       Take by mouth daily.           COQ10 OR  Ne Take 1 tablet by mouth daily. traMADol HCl 50 MG Tabs  Commonly known as:  ULTRAM  Next dose due:  AS NEEDED      Take 1 tablet (50 mg total) by mouth every 6 (six) hours as needed.    Tamar Thomson MD               Where to Get Your Medications [id number] ferrous sulfate EC tab 325 mg 09/13/19 0845 Given      [id number] gabapentin (NEURONTIN) cap 300 mg 09/12/19 1956 Given      [id number] traMADol HCl (ULTRAM) tab 50 mg 09/12/19 1311 Given      [id number] traMADol HCl (ULTRAM) tab 50 mg 09/12/19 195 Author:  Sha Wright PA-C Service:  Orthopedics Author Type:  Physician Assistant    Filed:  9/6/2019 12:06 PM Date of Service:  8/29/2019 12:02 PM Status:  Signed    :  Christian Sauceda (Physician Assistant) • KNEE TOTAL REPLACEMENT Right 11/6/2018    Performed by Placido Hodge MD at [facility] OR   • TOTAL KNEE REPLACEMENT Right 11/06/2018    DR. DURAN     Family History   Problem Relation Age of Onset   • Breast Cancer Paternal Aunt 61   • Heart Too Crest CA 9.1 11/20/2018    ALB 2.8 (L) 11/12/2018    ALKPHO 39 11/12/2018    BILT 1.1 11/12/2018    TP 5.7 (L) 11/12/2018    AST 29 11/12/2018    ALT 5 (L) 11/12/2018    PTT 32.3 09/05/2019    INR 1.00 09/05/2019[CO. 2]         X-RAYS:    Standing AP of both kne 0-28   Low Risk. TCM Follow-Up Recommendation:[FG.1]  LACE 29-58:  Moderate Risk of readmission after discharge from the hospital.[FG.2]          Problem List: Patient Active Problem List:     Pacemaker Medtronic     Dyslipidemia     Gastritis     Neurop Instructions Prescription details   Calcium Carbonate Antacid 500 MG Chew  Commonly known as:  TUMS      Chew 1 tablet (500 mg total) by mouth every 4 (four) hours as needed.    Refills:  0     docusate sodium 100 MG Caps  Commonly known as:  Guion Kale Take 3 mg by mouth nightly. Refills:  0     MIRALAX OR      Take 17 g by mouth daily. Refills:  0     OSTEO BI-FLEX ADV DOUBLE ST OR      Take 2 tablets by mouth daily.    Refills:  0     Rosuvastatin Calcium 10 MG Tabs  Commonly known as:  CRESTOR :  Elen Blue PTA (Physical Therapy Assistant)       PHYSICAL THERAPY KNEE TREATMENT NOTE - INPATIENT     Room Number: 127/449-M             Presenting Problem: L TKA    Problem List  Principal Problem:    Osteoarthritis of left knee  Active Dynamic Sitting: Fair +  Static Standing: Not tested  Dynamic Standing: Not tested    ACTIVITY TOLERANCE                         O2 WALK                  AM-PAC '6-Clicks' INPATIENT SHORT FORM - BASIC MOBILITY  How much difficulty does the patient currentl Current Status Min/mod A am[RM. 1]  Mod A pm[RM. 2]   Goal #2 Patient is able to demonstrate transfers Sit to/from Stand at assistance level: modified independent     Goal #2  Current Status Min A with the RW am[RM. 1]  Mod A pm[RM. 2]   Goal #3 Patient is abl Pt was able to AMB about 100' with the RW min A. Pt with decreased daniella and shuffling gait with forward flexed posture due to her Hx of parkinson's.  Pt is cued to correct and for proper sequencing with the R. Returned pt to the room and to sitting in th AM-PAC '6-Clicks' INPATIENT SHORT FORM - BASIC MOBILITY  How much difficulty does the patient currently have. ..  -   Turning over in bed (including adjusting bedclothes, sheets and blankets)?: A Little   -   Sitting down on and standing up from a chair wit assistance level: modified independent     Goal #2  Current Status Min A with the RW am   Goal #3 Patient is able to ambulate 300 feet with assistive device at assistance level: modified independent    Goal #3   Current Status 100' with the RW min A am   G her overall functional recovery. She has hx of R TKA with JACOB placement. [TF.1]  Initiated instruction of HEP on the R LE as per day #0 exes , amb with RW as support min A 100' c/o being loopy as she was just given morphine.  Left on the recliner and encoura • Osteoarthritis    • Pacemaker    • Parkinson disease ([facility] [address]    • Ureteropelvic junction obstruction 1963    per NextGen:     • Viral encephalitis     per NextGen:    • Visual impairment     glasses[TF.2]       Past Surgical History[TF.1]  Past Surgical Hi -   Turning over in bed (including adjusting bedclothes, sheets and blankets)?: A Little   -   Sitting down on and standing up from a chair with arms (e.g., wheelchair, bedside commode, etc.): A Little   -   Moving from lying on back to sitting on the side Goal #4   Current Status    Goal #5  AROM 0 degrees extension to 95 degrees flexion     Goal #5   Current Status    Goal #6 Patient independently performs home exercise program for ROM/strengthening per the instructions provided in preparation for discharg Mobility Short Form. Research supports that patients with this level of impairment may benefit from JAOCB. Patient will benefit from continued IP PT services to address these deficits in preparation for discharge.     DISCHARGE RECOMMENDATIONS[TF.1]  PT D Performed by Deanna Lozada MD at [address] [place] [address] OR   • TOTAL KNEE REPLACEMENT Right 11/06/2018    DR. DURAN[TF.2]       HOME SITUATION[TF.1]  Type of Home: House   Home Layout: Multi-level  Stairs to Enter : 5  Railing: Yes  Stairs to Bedroom: 5  Ra -   Need to walk in hospital room?: A Little   -   Climbing 3-5 steps with a railing?: A Lot[TF.2]     AM-PAC Score:[TF.1]  Raw Score: 17   Approx Degree of Impairment: 50.57%   Standardized Score (AM-PAC Scale): 42.13   CMS Modifier (G-Code): CK[TF.2] TF.3 Sachi YANES, PT on 9/10/2019  5:41 PM                        Occupational Therapy Notes (last 72 hours) (Notes from 9/10/2019  9:40 AM through 9/13/2019  9:40 AM)      Occupational Therapy Note signed by Asa Paredes at 9/12/2019  2:50 Pt remained up in chair at end of session with CHARU legs elevated and ice applied to L knee. All needs within reach, and family present. Ludy Band     DISCHARGE RECOMMENDATIONS  OT Discharge Recommendations: Sub-acute rehabilitation  OT Device Recommendations: Reach FUNCTIONAL ADL ASSESSMENT  Grooming: supervision with set up   Bathing: NT  Toileting: CGA  Upper Body Dressing: supervision with set up   Lower Body Dressing: min assist with instruction in [facility]    Education Provided: role of OT, car transfers discussed, her own cooking and cleaning, she has a dtr nearby taht can assist as needed, Pt reports having Parkinsons disease. Pt was min a for sit<> stand and t/f to chair. Pt was mod a for LE dressing, she uses forward bend technique when seated in chair.  Pt was per NextGen: cystoscopy with bilateral retrograde pyelography 2013   • High cholesterol    • History of pregnancy     per NextGen: 4 vaginal deliveries   • Incontinence    • Mitral valve prolapse     per NextGen:    • Neuropathy     fingers, feet   • Oste NEUROLOGICAL FINDINGS                   ACTIVITIES OF DAILY LIVING ASSESSMENT  AM-PAC ‘6-Clicks’ Inpatient Daily Activity Short Form  How much help from another person does the patient currently need…  -   Putting on and taking off regul Attribution [employer]    HW.1 - Pascal Spatz, OT on 9/11/2019 10:03 AM                     Video Swallow Study Notes    No notes of this type exist for this encounter. SLP Notes    No notes of this type exist for this encounter.      Future Appointments

## (undated) NOTE — ED AVS SNAPSHOT
Wen Any   MRN: [id number]    Department:  [facility] Emergency Department   Date of Visit:  8/19/2018           Disclosure     Insurance plans vary and the physician(s) referred by the ER may not be covered by your plan.  Please contac within the next three months to obtain basic health screening including reassessment of your blood pressure.     IF THERE IS ANY CHANGE OR WORSENING OF YOUR CONDITION, CALL YOUR PRIMARY CARE PHYSICIAN AT ONCE OR RETURN IMMEDIATELY TO THE EMERGENCY DEPARTMEN

## (undated) NOTE — LETTER
[address], [place]  Authorization for Invasive Procedures  1.  I hereby authorize Dr Valencia Lambert, my physician and whomever may be designated as the doctor's assistant, to perform the following operation and/or procedure Permanent Pacem performed for the purposes of advancing medicine, science, and/or education, provided my identity is not revealed. If the procedure has been videotaped, the physician/surgeon will obtain the original videotape.  The hospital will not be responsible for stor My signature below affirms that prior to the time of the procedure, I have explained to the patient and/or her legal representative, the risks and benefits involved in the proposed treatment and any reasonable alternative to the proposed treatment.  I have